# Patient Record
Sex: MALE | Race: WHITE | NOT HISPANIC OR LATINO | Employment: UNEMPLOYED | URBAN - METROPOLITAN AREA
[De-identification: names, ages, dates, MRNs, and addresses within clinical notes are randomized per-mention and may not be internally consistent; named-entity substitution may affect disease eponyms.]

---

## 2021-03-03 ENCOUNTER — OFFICE VISIT CONVERTED (OUTPATIENT)
Dept: SURGERY | Facility: CLINIC | Age: 51
End: 2021-03-03
Attending: NURSE PRACTITIONER

## 2021-03-05 ENCOUNTER — HOSPITAL ENCOUNTER (OUTPATIENT)
Dept: PREADMISSION TESTING | Facility: HOSPITAL | Age: 51
Discharge: HOME | End: 2021-03-05
Attending: SURGERY

## 2021-03-05 LAB — SARS-COV-2 RNA SPEC QL NAA+PROBE: NOT DETECTED

## 2021-03-11 ENCOUNTER — HOSPITAL ENCOUNTER (OUTPATIENT)
Dept: GASTROENTEROLOGY | Facility: HOSPITAL | Age: 51
Setting detail: HOSPITAL OUTPATIENT SURGERY
Discharge: HOME | End: 2021-03-11
Attending: SURGERY

## 2021-05-10 NOTE — H&P
History and Physical      Patient Name: Kaushik Walsh   Patient ID: [id number]   Sex: Male   YOB: 1970        Visit Date: March 3, 2021    Provider: GEN Morrow   [facility] [facility]   Location Address: [address] [place] [address]  [id number]   Location Phone: [phone]          Chief Complaint  · Age 50 or over  · Constipation  · Epigastric Pain  · GERD  · Requesting EGD and Colonoscopy     abnormal stool study       History Of Present Illness  The patient is a 51 year old male presenting to the Surgical Specialist office on a referral from Constantine Avalos MD.   Kaushik Walsh needs to have a diagnostic colonoscopy and EGD.   Patient states that they have not had a colonoscopy.   Patient currently complains of: constipation, GERD, abnormal stool study, and epigastric pain   Patient Does not have family history of colon cancer.      Patient presents today on referral from Dr. Constantine Avalos for GERD, epigastric pain, and abnormal stool study.  Patient reports that he has GERD and epigastric pain for the last 4 to 5 years.  He denies any dysphagia or feeling as if food gets stuck.  Admits to taking omeprazole for 4 to 5 years.  Despite taking omeprazole he is still with breakthrough symptoms at night.  Patient admits to a positive Cologuard.  Admits to having constipation for several years.  He treats his self with MiraLAX and laxatives almost daily.  Denies any rectal bleeding.  Denies any family history of colorectal cancer.  No previous colonoscopy.       Past Medical History  Disease Name Date Onset Notes   GERD --  --    Mood disorder --  --          Past Surgical History  Procedure Name Date Notes   Deviated Septum Surgery --  --          Medication List  Name Date Started Instructions   amitriptyline 75 mg oral tablet  take 1 tablet (75 mg) by oral route once daily at bedtime   hydroxyzine HCl 25 mg oral tablet  1 tablet PO QD   lamotrigine 250 mg oral tablet  extended release 24hr  take 1 tablet (250 mg) by oral route once daily swallowing whole. Do not crush, chew and/or divide.   Suprep Bowel Prep Kit 17.5-3.13-1.6 gram oral recon soln 03/03/2021 take as directed         Allergy List  Allergen Name Date Reaction Notes   NO KNOWN DRUG ALLERGIES --  --  --        Allergies Reconciled  Family Medical History  Disease Name Relative/Age Notes   Family history of breast cancer  Aunt/50s         Social History  Finding Status Start/Stop Quantity Notes   Alcohol Never --/-- --  --    Caffeine Current every day --/-- --  --    Tobacco Never --/-- --  --          Review of Systems  · Constitutional  o Denies  o : fever, chills  · Eyes  o Denies  o : yellowish discoloration of eyes  · HENT  o Denies  o : difficulty swallowing  · Cardiovascular  o Denies  o : chest pain, chest pain on exertion  · Respiratory  o Denies  o : shortness of breath  · Gastrointestinal  o Admits  o : constipation, heartburn, reflux, abdominal pain  o Denies  o : nausea, vomiting, diarrhea  · Genitourinary  o Denies  o : abnormal color of urine  · Integument  o Denies  o : rash  · Neurologic  o Denies  o : tingling or numbness  · Musculoskeletal  o Denies  o : joint pain  · Endocrine  o Denies  o : weight gain, weight loss      Vitals  Date Time BP Position Site L\R Cuff Size HR RR TEMP (F) WT  HT  BMI kg/m2 BSA m2 O2 Sat FR L/min FiO2 HC       03/03/2021 11:35 AM       16  184lbs 16oz 6'   25.09 2.06             Physical Examination  · Constitutional  o Appearance  o : well developed, well-nourished, patient in no apparent distress  · Head and Face  o Head  o :   § Inspection  § : atraumatic, normocephalic  o Face  o :   § Inspection  § : no facial lesions  · Eyes  o Conjunctivae  o : conjunctivae normal  o Sclerae  o : sclerae white  · Neck  o Inspection/Palpation  o : normal appearance, no masses or tenderness, trachea midline  · Respiratory  o Respiratory Effort  o : breathing unlabored  · Skin and  Subcutaneous Tissue  o General Inspection  o : no lesions present, no areas of discoloration, skin turgor normal, texture normal  · Neurologic  o Mental Status Examination  o :   § Orientation  § : grossly oriented to person, place and time  § Attention  § : attention normal, concentration abilities normal  § Fund of Knowledge  § : fund of knowledge within normal limits, patient aware of current events  o Gait and Station  o : normal gait, able to stand without difficulty  · Psychiatric  o Judgement and Insight  o : judgment and insight intact  o Mood and Affect  o : mood normal, affect appropriate              Assessment  · Abnormal stool test     792.1/R19.5  · Abdominal Pain, Epigastric     789.06/R10.13  · Constipation     564.00/K59.00  · GERD (gastroesophageal reflux disease)     530.81/K21.9  · Pre-op testing     V72.84/Z01.818    Problems Reconciled  Plan  · Orders  o Consent for Colonoscopy with Possible Biopsy - Possible risks/complications, benefits, and alternatives to surgical or invasive procedure have been explained to patient and/or legal guardian. -Patient has been evaluated and can tolerate anesthesia and/or sedation. Risks, benefits, and alternatives to anesthesia and sedation have been explained to patient and/or legal guardian. (56063) - 792.1/R19.5, 564.00/K59.00 - 03/11/2021  o Consent for Esophagogastroduodenoscopy (EGD) with dilation - Possible risks/complications, benefits, and alternatives to surgical or invasive procedure have been explained to patient and/or legal guardian. - Patient has been evaluated and can tolerate anesthesia and/or sedation. Risks, benefits, and alternatives to anesthesia and sedation have been explained to patient and/or legal guardian. (12710) - 789.06/R10.13, 530.81/K21.9 - 03/11/2021  o [facility] [place]37943[place] - V72.84/Z01.818 - 03/05/2021  · Medications  o Medications have been Reconciled  o Transition of Care or Provider  Policy  · Instructions  o Surgical Facility: [facility]  o Handouts Provided Pre-Procedure Instructions including date, time, and location of procedure.   o PLAN: Proceeed with colonoscopy. Patient understands risks/benefits and is willing to proceed.   o PLAN: Proceeed with EGD. Patient understands risks/benefits and is willing to proceed.   o ***Surgical Orders***  o RISK AND BENEFITS:  o Given these options, the patient has verbally expressed an understanding of the risks of the surgery and finds these risks acceptable. Will proceed with surgery as soon as possible.  o O.R. PREP: Per protocol   o IV: Per Anesthesia  o Please sign permit for: Colonoscopy with possible biopsies by Dr. Hernandez.  o Please sign permit for: Esophagogastroduodenoscopy with possible biopsies and dilation by Dr. Hernandez.  o The above History and Physical Examination has been completed within 30 days of admission.  o ***Patient Status***  o Outpatient  o Follow up in the in the office post procedure.  o Electronically Identified Patient Education Materials Provided Electronically  · Disposition  o Call or Return if symptoms worsen or persist.            Electronically Signed by: GEN Morrow -Author on March 3, 2021 12:46:03 PM

## 2021-05-14 VITALS — BODY MASS INDEX: 25.06 KG/M2 | WEIGHT: 185 LBS | HEIGHT: 72 IN | RESPIRATION RATE: 16 BRPM

## 2021-11-24 ENCOUNTER — OFFICE VISIT (OUTPATIENT)
Dept: SURGERY | Facility: CLINIC | Age: 51
End: 2021-11-24

## 2021-11-24 VITALS — WEIGHT: 191 LBS | BODY MASS INDEX: 25.87 KG/M2 | RESPIRATION RATE: 16 BRPM | HEIGHT: 72 IN

## 2021-11-24 DIAGNOSIS — K59.00 CONSTIPATION, UNSPECIFIED CONSTIPATION TYPE: Primary | ICD-10-CM

## 2021-11-24 DIAGNOSIS — K21.9 GASTROESOPHAGEAL REFLUX DISEASE, UNSPECIFIED WHETHER ESOPHAGITIS PRESENT: ICD-10-CM

## 2021-11-24 PROCEDURE — 99213 OFFICE O/P EST LOW 20 MIN: CPT | Performed by: SURGERY

## 2021-11-24 RX ORDER — PROPRANOLOL HYDROCHLORIDE 20 MG/1
TABLET ORAL
COMMUNITY
Start: 2021-10-14 | End: 2021-12-01

## 2021-11-24 RX ORDER — PANTOPRAZOLE SODIUM 40 MG/1
40 TABLET, DELAYED RELEASE ORAL DAILY
Qty: 30 TABLET | Refills: 2 | Status: SHIPPED | OUTPATIENT
Start: 2021-11-24 | End: 2021-12-24

## 2021-11-24 RX ORDER — LAMOTRIGINE 100 MG/1
TABLET ORAL
COMMUNITY
Start: 2021-08-26

## 2021-11-24 RX ORDER — AMITRIPTYLINE HYDROCHLORIDE 100 MG/1
100 TABLET, FILM COATED ORAL
COMMUNITY
Start: 2021-10-14

## 2021-11-24 RX ORDER — PROPRANOLOL HYDROCHLORIDE 40 MG/1
TABLET ORAL
COMMUNITY
Start: 2021-10-19

## 2021-11-24 RX ORDER — HYDROXYZINE HYDROCHLORIDE 25 MG/1
25 TABLET, FILM COATED ORAL
COMMUNITY
Start: 2021-11-01

## 2021-11-24 RX ORDER — LAMOTRIGINE 25 MG/1
TABLET ORAL
COMMUNITY
Start: 2021-08-26

## 2021-11-24 RX ORDER — ESZOPICLONE 3 MG/1
3 TABLET, FILM COATED ORAL
COMMUNITY
Start: 2021-09-02 | End: 2021-12-01

## 2021-11-24 RX ORDER — AMITRIPTYLINE HYDROCHLORIDE 75 MG/1
TABLET, FILM COATED ORAL
COMMUNITY
End: 2021-12-01

## 2021-11-24 NOTE — PROGRESS NOTES
General Surgery/Colorectal Surgery Note    Patient Name:  Kaushik Walsh  YOB: 1970  [id number]    Referring Provider: Provider, No Known      Patient Care Team:  Constantine Avalos MD as PCP - General (Family Medicine)  Bubba Hernandez MD as Consulting Physician (General Surgery)    Chief complaint reflux    Subjective .     History of present illness:    Previously seen by me.  History of reflux, epigastric abdominal pain, dysphagia, constipation.  Status post EGD by me 3/11/2021 with 6 mm polyp removed in the stomach body, no hiatal hernia  Duodenal biopsy with peptic duodenitis, no evidence of celiac, negative for H. pylori, fundic gland polyp, reflux esophagitis at GE junction negative for metaplasia  Colonoscopy 3/11/2021 normal.    He comes in for follow-up.  He still having intermittent reflux.  No current treatment.  No tobacco abuse.  He was told that his reflux is causing some damage to his teeth as well as a hoarse voice.  He is also having significant bloating with a long history of constipation treated with over-the-counter medications.  No right upper quadrant abdominal pain.  1 bowel movement per week.  No previous evaluation by GI.    History:  No past medical history on file.    No past surgical history on file.    No family history on file.    Social History     Tobacco Use   • Smoking status: Not on file   • Smokeless tobacco: Not on file   Substance Use Topics   • Alcohol use: Not on file   • Drug use: Not on file       Review of Systems  All systems were reviewed and negative except for:   Review of Systems   Constitutional: Negative for chills, fever and unexpected weight loss.   HENT: Negative for congestion, nosebleeds and voice change.    Eyes: Negative for blurred vision, double vision and discharge.   Respiratory: Negative for apnea, chest tightness and shortness of breath.    Cardiovascular: Negative for chest pain and leg swelling.   Gastrointestinal:        See HPI  "  Endocrine: Negative for cold intolerance and heat intolerance.   Genitourinary: Negative for dysuria, hematuria and urgency.   Musculoskeletal: Negative for back pain, joint swelling and neck pain.   Skin: Negative for color change and dry skin.   Neurological: Negative for dizziness and confusion.   Hematological: Negative for adenopathy.   Psychiatric/Behavioral: Negative for agitation and behavioral problems.     MEDS:  Prior to Admission medications    Not on File        Allergies:  Patient has no allergy information on record.    Objective     Vital Signs        Physical Exam:     General Appearance:    Alert, cooperative, in no acute distress   Head:    Normocephalic, without obvious abnormality, atraumatic   Eyes:          Conjunctivae and sclerae normal, no icterus,     Ears:    Ears appear intact with no abnormalities noted   Throat:   No oral lesions, no thrush, oral mucosa moist   Neck:   No adenopathy, supple, trachea midline, no thyromegaly   Back:     No kyphosis present, no scoliosis present, no skin lesions,      erythema or scars, no tenderness to percussion or                   palpation,   range of motion normal   Lungs:     Clear to auscultation,respirations regular, even and                  unlabored    Heart:    Regular rhythm and normal rate, normal S1 and S2, no            murmur, no gallop, no rub, no click   Chest Wall:    No abnormalities observed   Abdomen:     Normal bowel sounds, no masses, no organomegaly, soft        non-tender, non-distended, no guarding, no rebound                tenderness   Rectal:        Extremities:   Moves all extremities well, no edema, no cyanosis, no             redness   Pulses:   Pulses palpable and equal bilaterally   Skin:   No bleeding, bruising or rash   Lymph nodes:   No palpable adenopathy   Neurologic:   A/o x 4 with no deficits       Results Review:   {Results Review:33827::\"I reviewed the patient's new clinical results.\"    LABS/IMAGING:  No " results found for this or any previous visit.     Result Review :     Assessment/Plan     GERD with esophagitis  Abdominal bloating and constipation, possible IBS    I had a discussion with the patient.  I reviewed the findings of the upper and lower endoscopy with him.  I reviewed the pathology.  I will start him on a PPI.  If this does not improve his symptoms then we will consider reflux surgery.  I will refer him to gastroenterology for medical treatment of his constipation.  Orders placed.  All questions answered.  He agrees with the plan.  Follow-up with me in 4 to 6 weeks if reflux not improved on medication.  Thank you for this consult.              This document has been electronically signed by Bubba Hernandez MD  November 24, 2021 12:49 EST

## 2021-12-01 ENCOUNTER — OFFICE VISIT (OUTPATIENT)
Dept: GASTROENTEROLOGY | Facility: CLINIC | Age: 51
End: 2021-12-01

## 2021-12-01 VITALS
HEART RATE: 58 BPM | BODY MASS INDEX: 25.84 KG/M2 | HEIGHT: 72 IN | DIASTOLIC BLOOD PRESSURE: 78 MMHG | SYSTOLIC BLOOD PRESSURE: 126 MMHG | WEIGHT: 190.8 LBS

## 2021-12-01 DIAGNOSIS — K59.04 CHRONIC IDIOPATHIC CONSTIPATION: Primary | ICD-10-CM

## 2021-12-01 DIAGNOSIS — R10.84 GENERALIZED ABDOMINAL PAIN: ICD-10-CM

## 2021-12-01 DIAGNOSIS — R14.0 ABDOMINAL BLOATING: ICD-10-CM

## 2021-12-01 PROCEDURE — 99214 OFFICE O/P EST MOD 30 MIN: CPT | Performed by: NURSE PRACTITIONER

## 2021-12-01 NOTE — PROGRESS NOTES
Chief Complaint  Constipation    Kaushik Walsh is a 51 y.o. male who presents to [facility] on referral from Bubba Hernandez MD for a gastroenterology evaluation of constipation.  History of Present Illness  He was evaluated earlier this year by Dr. Hernandez (general surgery) for reflux and positive Cologuard.  EGD and colonoscopy performed on 3/11/2021 revealed a normal GE junction and gastric antrum.  6 mm polyp found in the stomach body.  Normal duodenum.  Normal mucosa in the whole colon.  Duodenum biopsy suggestive of peptic duodenitis.  Stomach body polyp-fundic gland polyp.  GE junction biopsy with features suggestive of mild reflux esophagitis.    He admits abdominal bloating and constipation that's been present for several years.  He has previously tried miralax and fiber which caused worsening bloating.      He is taking 2-3 stool softeners after every meal.  He then takes a stimulant laxative eery 3-4 days and will have a bowel movemetn about once per week and has several bowel movements.  Denies rectal bleeding.  Admits generalized abdominal pain and bloating.  Not improved or worsened with anything.      Admits reflux that is controlled with protonix.          Past Medical History:   Diagnosis Date   • Anxiety        Past Surgical History:   Procedure Laterality Date   • COLONOSCOPY      [facility]   • NASAL SEPTAL RECONSTRUCTION     • UPPER GASTROINTESTINAL ENDOSCOPY      [facility]         Current Outpatient Medications:   •  amitriptyline (ELAVIL) 100 MG tablet, Take 100 mg by mouth every night at bedtime., Disp: , Rfl:   •  hydrOXYzine (ATARAX) 25 MG tablet, Take 25 mg by mouth every night at bedtime., Disp: , Rfl:   •  lamoTRIgine (LaMICtal) 100 MG tablet, , Disp: , Rfl:   •  lamoTRIgine (LaMICtal) 25 MG tablet, , Disp: , Rfl:   •  pantoprazole (Protonix) 40 MG EC tablet, Take 1 tablet by mouth Daily for 30 days., Disp: 30 tablet, Rfl: 2  •  propranolol (INDERAL) 40 MG  "tablet, TAKE 1 TABLET BY MOUTH THREE TIMES DAILY AS NEEDED for anxiety (may use 1/2 TABLET doses), Disp: , Rfl:   •  linaclotide (Linzess) 290 MCG capsule capsule, Take 1 capsule by mouth Every Morning Before Breakfast., Disp: 30 capsule, Rfl: 1     No Known Allergies    Family History   Family history unknown: Yes        Social History     Social History Narrative   • Not on file       Immunization:  Immunization History   Administered Date(s) Administered   • COVID-19 (PFIZER) 03/30/2021, 04/20/2021        Objective     Review of Systems   Constitutional: Negative for fever and unexpected weight loss.   Eyes: Negative for blurred vision and double vision.   Respiratory: Negative for cough and shortness of breath.    Cardiovascular: Negative for chest pain and palpitations.   Gastrointestinal:        See HPI   Genitourinary: Negative for dysuria and hematuria.   Musculoskeletal: Negative for gait problem and joint swelling.   Skin: Negative for rash and skin lesions.   Neurological: Negative for seizures, weakness, numbness and confusion.   Psychiatric/Behavioral: Negative for sleep disturbance and depressed mood.        Vital Signs:   /78 (BP Location: Left arm, Patient Position: Sitting, Cuff Size: Adult)   Pulse 58   Ht 182.9 cm (72\")   Wt 86.5 kg (190 lb 12.8 oz)   BMI 25.88 kg/m²       Physical Exam  Constitutional:       General: He is not in acute distress.     Appearance: Normal appearance. He is well-developed and normal weight.   HENT:      Head: Normocephalic and atraumatic.   Eyes:      Conjunctiva/sclera: Conjunctivae normal.      Pupils: Pupils are equal, round, and reactive to light.      Visual Fields: Right eye visual fields normal and left eye visual fields normal.   Cardiovascular:      Rate and Rhythm: Normal rate and regular rhythm.      Heart sounds: Normal heart sounds.   Pulmonary:      Effort: Pulmonary effort is normal. No retractions.      Breath sounds: Normal breath sounds and " air entry.   Abdominal:      General: Bowel sounds are normal.      Palpations: Abdomen is soft.      Tenderness: There is no abdominal tenderness.      Comments: No appreciable hepatosplenomegaly or ascites   Musculoskeletal:         General: Normal range of motion.      Cervical back: Neck supple.      Right lower leg: No edema.      Left lower leg: No edema.   Lymphadenopathy:      Cervical: No cervical adenopathy.   Skin:     General: Skin is warm and dry.      Findings: No lesion.   Neurological:      General: No focal deficit present.      Mental Status: He is alert and oriented to person, place, and time.   Psychiatric:         Mood and Affect: Mood and affect normal.         Behavior: Behavior normal.         Result Review :   The following data was reviewed by: GEN Gr on 12/01/2021:    2/4/2021 Cologuard-positive                  Assessment and Plan    Diagnoses and all orders for this visit:    1. Chronic idiopathic constipation (Primary)  -     linaclotide (Linzess) 290 MCG capsule capsule; Take 1 capsule by mouth Every Morning Before Breakfast.  Dispense: 30 capsule; Refill: 1    2. Generalized abdominal pain  -     linaclotide (Linzess) 290 MCG capsule capsule; Take 1 capsule by mouth Every Morning Before Breakfast.  Dispense: 30 capsule; Refill: 1    3. Abdominal bloating  -     linaclotide (Linzess) 290 MCG capsule capsule; Take 1 capsule by mouth Every Morning Before Breakfast.  Dispense: 30 capsule; Refill: 1      Recommend patient begin Linzess (new Rx sent) for treatment of constipation.  Discussed with patient that most common side effect is diarrhea.  If this occurs and does not resolve within the first 2 weeks of treatment patient will call office.      Follow Up   Return in about 3 months (around 3/1/2022) for constipation.  Patient was given instructions and counseling regarding his condition or for health maintenance advice. Please see specific information pulled into the  AVS if appropriate.

## 2021-12-06 ENCOUNTER — TELEPHONE (OUTPATIENT)
Dept: GASTROENTEROLOGY | Facility: CLINIC | Age: 51
End: 2021-12-06

## 2021-12-06 NOTE — TELEPHONE ENCOUNTER
Patient was seen last week and was given Linzess, he has taken it for a week now and has still not been able to have a bowel movement. He said he had a small bm last night but not very much at all. He is wanting to know what we recommend?

## 2021-12-07 NOTE — TELEPHONE ENCOUNTER
Please advise him to try taking the Linzess with a fatty meal.  Sometimes this will improve the effect.  If that is not effective, would recommend that he drink 1 bottle of magnesium citrate followed by water and then continue linzess daily.

## 2022-01-03 ENCOUNTER — TELEPHONE (OUTPATIENT)
Dept: GASTROENTEROLOGY | Facility: CLINIC | Age: 52
End: 2022-01-03

## 2022-01-03 NOTE — TELEPHONE ENCOUNTER
Patient called in today and says that he is feeling better with the Linzess, he does however still have to take a laxative every 5-7 days to get things moving but says that is normal for him.  He wants to know if we recommend he still take the linzess or try something new.

## 2022-01-04 NOTE — TELEPHONE ENCOUNTER
I would prefer that he not have to take a stimulant laxative in addition to the linzess.  Is Linzess the first prescription medication that he's used for constipation?

## 2022-01-05 RX ORDER — PLECANATIDE 3 MG/1
3 TABLET ORAL DAILY
Qty: 90 TABLET | Refills: 1 | Status: SHIPPED | OUTPATIENT
Start: 2022-01-05 | End: 2022-01-11 | Stop reason: CLARIF

## 2022-01-11 RX ORDER — LUBIPROSTONE 24 UG/1
24 CAPSULE ORAL 2 TIMES DAILY WITH MEALS
Qty: 60 CAPSULE | Refills: 3 | Status: SHIPPED | OUTPATIENT
Start: 2022-01-11 | End: 2022-04-11

## 2022-01-11 NOTE — TELEPHONE ENCOUNTER
Patient states that Trulance is not covered by his insurance plan but Palomo is, he is wondering if we can send this instead.

## 2022-03-04 RX ORDER — PANTOPRAZOLE SODIUM 40 MG/1
40 TABLET, DELAYED RELEASE ORAL DAILY
Qty: 90 TABLET | Refills: 0 | Status: SHIPPED | OUTPATIENT
Start: 2022-03-04 | End: 2023-03-04

## 2022-03-04 NOTE — TELEPHONE ENCOUNTER
Patient asked for refills on Pantoprazole 40 mg daily #90.  He isn't sure if this is a medication Dr. Hernandez wants him to take long-term.    Pharmacy verified with patient.

## 2022-03-04 NOTE — TELEPHONE ENCOUNTER
I called the patient and he said he is happy with the reflux medication.  He tried stopping the medication and symptoms returned.  Medication is helping.

## 2023-06-12 ENCOUNTER — OFFICE VISIT (OUTPATIENT)
Dept: FAMILY MEDICINE CLINIC | Facility: CLINIC | Age: 53
End: 2023-06-12
Payer: MEDICARE

## 2023-06-12 VITALS
BODY MASS INDEX: 25.06 KG/M2 | SYSTOLIC BLOOD PRESSURE: 128 MMHG | HEART RATE: 61 BPM | DIASTOLIC BLOOD PRESSURE: 86 MMHG | HEIGHT: 72 IN | OXYGEN SATURATION: 97 % | WEIGHT: 185 LBS

## 2023-06-12 DIAGNOSIS — Z12.5 PROSTATE CANCER SCREENING: ICD-10-CM

## 2023-06-12 DIAGNOSIS — Z11.59 NEED FOR HEPATITIS C SCREENING TEST: ICD-10-CM

## 2023-06-12 DIAGNOSIS — Z76.89 ENCOUNTER TO ESTABLISH CARE: ICD-10-CM

## 2023-06-12 DIAGNOSIS — Z00.00 MEDICARE ANNUAL WELLNESS VISIT, INITIAL: Primary | ICD-10-CM

## 2023-06-12 DIAGNOSIS — E78.5 DYSLIPIDEMIA: ICD-10-CM

## 2023-06-12 DIAGNOSIS — Z71.85 VACCINE COUNSELING: ICD-10-CM

## 2023-06-12 PROBLEM — K21.9 GASTROESOPHAGEAL REFLUX DISEASE: Status: ACTIVE | Noted: 2017-03-07

## 2023-06-12 PROBLEM — F32.A DEPRESSIVE DISORDER: Status: ACTIVE | Noted: 2017-03-07

## 2023-06-12 PROBLEM — F41.9 ANXIETY DISORDER: Status: ACTIVE | Noted: 2017-03-07

## 2023-06-12 PROBLEM — F39 MOOD DISORDER: Status: ACTIVE | Noted: 2023-06-12

## 2023-06-12 PROCEDURE — 1160F RVW MEDS BY RX/DR IN RCRD: CPT | Performed by: NURSE PRACTITIONER

## 2023-06-12 PROCEDURE — G0438 PPPS, INITIAL VISIT: HCPCS | Performed by: NURSE PRACTITIONER

## 2023-06-12 PROCEDURE — 1159F MED LIST DOCD IN RCRD: CPT | Performed by: NURSE PRACTITIONER

## 2023-06-12 PROCEDURE — 1170F FXNL STATUS ASSESSED: CPT | Performed by: NURSE PRACTITIONER

## 2023-06-12 RX ORDER — PANTOPRAZOLE SODIUM 40 MG/1
TABLET, DELAYED RELEASE ORAL
COMMUNITY
Start: 2023-04-20

## 2023-06-12 RX ORDER — PERPHENAZINE 2 MG
TABLET ORAL
COMMUNITY

## 2023-06-12 RX ORDER — THEANINE 100 MG
CAPSULE ORAL
COMMUNITY

## 2023-06-12 NOTE — PROGRESS NOTES
The ABCs of the Annual Wellness Visit  Initial Medicare Wellness Visit    Subjective       Kaushik Walsh is a 53 y.o. male who presents for an Initial Medicare Wellness Visit.    The following portions of the patient's history were reviewed and updated as appropriate: allergies, current medications, past family history, past medical history, past social history, past surgical history, and problem list.     Compared to one year ago, the patient feels his physical health is better.    Compared to one year ago, the patient feels his mental health is the same.    Recent Hospitalizations:  He was not admitted to the hospital during the last year.       Current Medical Providers:  Patient Care Team:  Maria Esther Cid APRN as PCP - General (Nurse Practitioner)  Bubba Hernandez MD as Consulting Physician (General Surgery)  Jimbo Matthew APRN - mental health    Outpatient Medications Prior to Visit   Medication Sig Dispense Refill    amitriptyline (ELAVIL) 100 MG tablet Take 1 tablet by mouth every night at bedtime.      hydrOXYzine (ATARAX) 25 MG tablet Take 1 tablet by mouth every night at bedtime. Pt takes 2 to 3 tables QHS  as needed for sleep      L-Theanine 100 MG capsule Take  by mouth.      lamoTRIgine (LaMICtal) 100 MG tablet 2 tablets. Take 1/2 in the AM and a whole in the PM      Omega 3-6-9 capsule Take  by mouth.      pantoprazole (PROTONIX) 40 MG EC tablet       propranolol (INDERAL) 40 MG tablet TAKE 1 TABLET BY MOUTH THREE TIMES DAILY AS NEEDED for anxiety (may use 1/2 TABLET doses)      lamoTRIgine (LaMICtal) 25 MG tablet        No facility-administered medications prior to visit.       No opioid medication identified on active medication list. I have reviewed chart for other potential  high risk medication/s and harmful drug interactions in the elderly.        Aspirin is not on active medication list.  Aspirin use is not indicated based on review of current medical condition/s. Risk of harm  "outweighs potential benefits.      Patient Active Problem List   Diagnosis    Gastroesophageal reflux disease    Anxiety disorder    Depressive disorder    Mood disorder     Advance Care Planning   Advance Care Planning     Advance Directive is not on file.  ACP discussion was held with the patient during this visit. Patient does not have an advance directive, information provided.       Objective    Vitals:    06/12/23 0925   BP: 128/86   Pulse: 61   SpO2: 97%   Weight: 83.9 kg (185 lb)   Height: 181.6 cm (71.5\")     Estimated body mass index is 25.44 kg/m² as calculated from the following:    Height as of this encounter: 181.6 cm (71.5\").    Weight as of this encounter: 83.9 kg (185 lb).    BMI is >= 25 and <30. (Overweight) The following options were offered after discussion;: weight loss educational material (shared in after visit summary)      Does the patient have evidence of cognitive impairment? No    Physical Exam  Vitals reviewed.   Constitutional:       General: He is not in acute distress.     Appearance: Normal appearance. He is well-developed.   HENT:      Head: Normocephalic and atraumatic.      Ears:      Comments: Left EAC with very small, likely less than 2mm white papular appearing lesion - resembling a pimple at the 9 o'clock position.  Eyes:      General: No scleral icterus.     Extraocular Movements: Extraocular movements intact.      Conjunctiva/sclera: Conjunctivae normal.   Cardiovascular:      Rate and Rhythm: Normal rate and regular rhythm.      Pulses: Normal pulses.      Heart sounds: No murmur heard.  Pulmonary:      Effort: Pulmonary effort is normal. No respiratory distress.      Breath sounds: Normal breath sounds. No wheezing, rhonchi or rales.   Musculoskeletal:         General: Normal range of motion.      Cervical back: Normal range of motion.   Skin:     General: Skin is warm and dry.   Neurological:      Mental Status: He is alert and oriented to person, place, and time. "   Psychiatric:         Mood and Affect: Mood and affect normal.         Behavior: Behavior normal.         Thought Content: Thought content normal.         Judgment: Judgment normal.               HEALTH RISK ASSESSMENT    Smoking Status:  Social History     Tobacco Use   Smoking Status Never   Smokeless Tobacco Current    Types: Chew     Alcohol Consumption:  Social History     Substance and Sexual Activity   Alcohol Use Not Currently     Fall Risk Screen:    ALEXSANDER Fall Risk Assessment was completed, and patient is at LOW risk for falls.Assessment completed on:2023    Depression Screen:       2023     9:32 AM   PHQ-2/PHQ-9 Depression Screening   Little Interest or Pleasure in Doing Things 0-->not at all   Feeling Down, Depressed or Hopeless 0-->not at all   PHQ-9: Brief Depression Severity Measure Score 0       Health Habits and Functional and Cognitive Screenin/12/2023     9:00 AM   Functional & Cognitive Status   Do you have difficulty preparing food and eating? No   Do you have difficulty bathing yourself, getting dressed or grooming yourself? No   Do you have difficulty using the toilet? No   Do you have difficulty moving around from place to place? No   Do you have trouble with steps or getting out of a bed or a chair? No   Current Diet Well Balanced Diet   Dental Exam Up to date   Eye Exam Up to date   Exercise (times per week) 7 times per week   Current Exercises Include Walking   Do you need help using the phone?  No   Are you deaf or do you have serious difficulty hearing?  No   Do you need help with transportation? No   Do you need help shopping? No   Do you need help preparing meals?  No   Do you need help with housework?  No   Do you need help with laundry? No   Do you need help taking your medications? No   Do you need help managing money? No   Do you ever drive or ride in a car without wearing a seat belt? No   Have you felt unusual stress, anger or loneliness in the last month? No    Who do you live with? Alone   If you need help, do you have trouble finding someone available to you? No   Have you been bothered in the last four weeks by sexual problems? No   Do you have difficulty concentrating, remembering or making decisions? Yes       Age-appropriate Screening Schedule:  Refer to the list below for future screening recommendations based on patient's age, sex and/or medical conditions. Orders for these recommended tests are listed in the plan section. The patient has been provided with a written plan.    Health Maintenance   Topic Date Due    TDAP/TD VACCINES (1 - Tdap) Never done    ZOSTER VACCINE (1 of 2) Never done    HEPATITIS C SCREENING  Never done    INFLUENZA VACCINE  08/01/2023    ANNUAL WELLNESS VISIT  06/12/2024    COLORECTAL CANCER SCREENING  03/11/2031    COVID-19 Vaccine  Completed    Pneumococcal Vaccine 0-64  Aged Out          CMS Preventative Services Quick Reference  Risk Factors Identified During Encounter    Immunizations Discussed/Encouraged: Tdap and Shingrix  Dental Screening Recommended  Vision Screening Recommended    The above risks/problems have been discussed with the patient.  Pertinent information has been shared with the patient in the After Visit Summary.  An After Visit Summary and PPPS were made available to the patient.  Diagnoses and all orders for this visit:    1. Medicare annual wellness visit, initial (Primary)    2. Encounter to establish care    3. Vaccine counseling  Comments:  Recommended: TDaP, Shingrix    4. Prostate cancer screening  -     PSA Screen; Future    5. Need for hepatitis C screening test  -     Hepatitis C Antibody; Future    6. Dyslipidemia  -     CBC Auto Differential; Future  -     Comprehensive Metabolic Panel; Future  -     Lipid Panel; Future      Follow Up:  Next Medicare Wellness visit to be scheduled in 1 year.      Return in about 3 months (around 9/12/2023) for Next scheduled follow up.

## 2023-06-29 ENCOUNTER — TELEPHONE (OUTPATIENT)
Dept: FAMILY MEDICINE CLINIC | Facility: CLINIC | Age: 53
End: 2023-06-29

## 2023-06-29 DIAGNOSIS — K21.9 GASTROESOPHAGEAL REFLUX DISEASE WITHOUT ESOPHAGITIS: Primary | ICD-10-CM

## 2023-06-29 RX ORDER — PANTOPRAZOLE SODIUM 40 MG/1
40 TABLET, DELAYED RELEASE ORAL DAILY
Qty: 90 TABLET | Refills: 0 | Status: SHIPPED | OUTPATIENT
Start: 2023-06-29

## 2023-06-29 NOTE — TELEPHONE ENCOUNTER
Caller: Kaushik Walsh    Relationship: Self    Best call back number:     [phone]       Requested Prescriptions:   Requested Prescriptions     Pending Prescriptions Disp Refills    pantoprazole (PROTONIX) 40 MG EC tablet      90 DAY SUPPLY    Pharmacy where request should be sent: [facility] - [place] [address] [address] [facility] - [phone]  - [phone] FX     Last office visit with prescribing clinician: 6/12/2023   Last telemedicine visit with prescribing clinician: Visit date not found   Next office visit with prescribing clinician: 9/11/2023     Does the patient have less than a 3 day supply:  [] Yes  [x] No    Would you like a call back once the refill request has been completed: [] Yes [x] No    If the office needs to give you a call back, can they leave a voicemail: [] Yes [x] No    Betty Harvey Rep   06/29/23 13:36 EDT

## 2023-09-06 ENCOUNTER — OFFICE VISIT (OUTPATIENT)
Dept: FAMILY MEDICINE CLINIC | Facility: CLINIC | Age: 53
End: 2023-09-06
Payer: MEDICARE

## 2023-09-06 VITALS
SYSTOLIC BLOOD PRESSURE: 120 MMHG | HEIGHT: 72 IN | OXYGEN SATURATION: 100 % | HEART RATE: 94 BPM | TEMPERATURE: 97.6 F | WEIGHT: 178.4 LBS | BODY MASS INDEX: 24.16 KG/M2 | DIASTOLIC BLOOD PRESSURE: 80 MMHG

## 2023-09-06 DIAGNOSIS — N52.9 ERECTILE DYSFUNCTION, UNSPECIFIED ERECTILE DYSFUNCTION TYPE: Primary | ICD-10-CM

## 2023-09-06 DIAGNOSIS — Z12.5 PROSTATE CANCER SCREENING: ICD-10-CM

## 2023-09-06 DIAGNOSIS — E78.5 DYSLIPIDEMIA: ICD-10-CM

## 2023-09-06 DIAGNOSIS — Z11.59 NEED FOR HEPATITIS C SCREENING TEST: ICD-10-CM

## 2023-09-06 LAB
ALBUMIN SERPL-MCNC: 4.7 G/DL (ref 3.5–5.2)
ALBUMIN/GLOB SERPL: 2 G/DL
ALP SERPL-CCNC: 111 U/L (ref 39–117)
ALT SERPL W P-5'-P-CCNC: 14 U/L (ref 1–41)
ANION GAP SERPL CALCULATED.3IONS-SCNC: 10 MMOL/L (ref 5–15)
AST SERPL-CCNC: 17 U/L (ref 1–40)
BASOPHILS # BLD AUTO: 0.07 10*3/MM3 (ref 0–0.2)
BASOPHILS NFR BLD AUTO: 1.1 % (ref 0–1.5)
BILIRUB SERPL-MCNC: 0.9 MG/DL (ref 0–1.2)
BILIRUB UR QL STRIP: NEGATIVE
BUN SERPL-MCNC: 10 MG/DL (ref 6–20)
BUN/CREAT SERPL: 9.3 (ref 7–25)
CALCIUM SPEC-SCNC: 9.8 MG/DL (ref 8.6–10.5)
CHLORIDE SERPL-SCNC: 102 MMOL/L (ref 98–107)
CHOLEST SERPL-MCNC: 169 MG/DL (ref 0–200)
CLARITY UR: CLEAR
CO2 SERPL-SCNC: 28 MMOL/L (ref 22–29)
COLOR UR: YELLOW
CREAT SERPL-MCNC: 1.07 MG/DL (ref 0.76–1.27)
DEPRECATED RDW RBC AUTO: 45 FL (ref 37–54)
EGFRCR SERPLBLD CKD-EPI 2021: 82.5 ML/MIN/1.73
EOSINOPHIL # BLD AUTO: 0.15 10*3/MM3 (ref 0–0.4)
EOSINOPHIL NFR BLD AUTO: 2.3 % (ref 0.3–6.2)
ERYTHROCYTE [DISTWIDTH] IN BLOOD BY AUTOMATED COUNT: 13.3 % (ref 12.3–15.4)
GLOBULIN UR ELPH-MCNC: 2.3 GM/DL
GLUCOSE SERPL-MCNC: 107 MG/DL (ref 65–99)
GLUCOSE UR STRIP-MCNC: NEGATIVE MG/DL
HCT VFR BLD AUTO: 48.5 % (ref 37.5–51)
HCV AB SER DONR QL: NORMAL
HDLC SERPL-MCNC: 70 MG/DL (ref 40–60)
HGB BLD-MCNC: 16.5 G/DL (ref 13–17.7)
HGB UR QL STRIP.AUTO: NEGATIVE
IMM GRANULOCYTES # BLD AUTO: 0.02 10*3/MM3 (ref 0–0.05)
IMM GRANULOCYTES NFR BLD AUTO: 0.3 % (ref 0–0.5)
KETONES UR QL STRIP: NEGATIVE
LDLC SERPL CALC-MCNC: 84 MG/DL (ref 0–100)
LDLC/HDLC SERPL: 1.18 {RATIO}
LEUKOCYTE ESTERASE UR QL STRIP.AUTO: NEGATIVE
LYMPHOCYTES # BLD AUTO: 1.87 10*3/MM3 (ref 0.7–3.1)
LYMPHOCYTES NFR BLD AUTO: 28.5 % (ref 19.6–45.3)
MCH RBC QN AUTO: 31.4 PG (ref 26.6–33)
MCHC RBC AUTO-ENTMCNC: 34 G/DL (ref 31.5–35.7)
MCV RBC AUTO: 92.2 FL (ref 79–97)
MONOCYTES # BLD AUTO: 0.5 10*3/MM3 (ref 0.1–0.9)
MONOCYTES NFR BLD AUTO: 7.6 % (ref 5–12)
NEUTROPHILS NFR BLD AUTO: 3.95 10*3/MM3 (ref 1.7–7)
NEUTROPHILS NFR BLD AUTO: 60.2 % (ref 42.7–76)
NITRITE UR QL STRIP: NEGATIVE
NRBC BLD AUTO-RTO: 0 /100 WBC (ref 0–0.2)
PH UR STRIP.AUTO: 6 [PH] (ref 5–8)
PLATELET # BLD AUTO: 214 10*3/MM3 (ref 140–450)
PMV BLD AUTO: 11.2 FL (ref 6–12)
POTASSIUM SERPL-SCNC: 4.2 MMOL/L (ref 3.5–5.2)
PROT SERPL-MCNC: 7 G/DL (ref 6–8.5)
PROT UR QL STRIP: NEGATIVE
PSA SERPL-MCNC: 1.41 NG/ML (ref 0–4)
RBC # BLD AUTO: 5.26 10*6/MM3 (ref 4.14–5.8)
SODIUM SERPL-SCNC: 140 MMOL/L (ref 136–145)
SP GR UR STRIP: 1.01 (ref 1–1.03)
TRIGL SERPL-MCNC: 83 MG/DL (ref 0–150)
UROBILINOGEN UR QL STRIP: NORMAL
VLDLC SERPL-MCNC: 15 MG/DL (ref 5–40)
WBC NRBC COR # BLD: 6.56 10*3/MM3 (ref 3.4–10.8)

## 2023-09-06 PROCEDURE — 80053 COMPREHEN METABOLIC PANEL: CPT | Performed by: NURSE PRACTITIONER

## 2023-09-06 PROCEDURE — 80061 LIPID PANEL: CPT | Performed by: NURSE PRACTITIONER

## 2023-09-06 PROCEDURE — 86803 HEPATITIS C AB TEST: CPT | Performed by: NURSE PRACTITIONER

## 2023-09-06 PROCEDURE — 99213 OFFICE O/P EST LOW 20 MIN: CPT | Performed by: NURSE PRACTITIONER

## 2023-09-06 PROCEDURE — 81003 URINALYSIS AUTO W/O SCOPE: CPT | Performed by: NURSE PRACTITIONER

## 2023-09-06 PROCEDURE — G0103 PSA SCREENING: HCPCS | Performed by: NURSE PRACTITIONER

## 2023-09-06 PROCEDURE — 85025 COMPLETE CBC W/AUTO DIFF WBC: CPT | Performed by: NURSE PRACTITIONER

## 2023-09-06 NOTE — PROGRESS NOTES
..  Venipuncture Blood Specimen Collection  Venipuncture performed in LT arm by Vania Hwang MA with good hemostasis. Patient tolerated the procedure well without complications.   09/06/23   Vania Hwang MA

## 2023-09-06 NOTE — PROGRESS NOTES
Chief Complaint  Anxiety and Depression    Subjective            Kaushik Walsh presents to [facility]    Erectile Dysfunction  This is a new problem. The current episode started more than 1 month ago. The problem has been gradually worsening since onset. The nature of his difficulty is maintaining erection. Non-physiologic factors contributing to erectile dysfunction are performance anxiety. He reports no decreased libido. He reports his erection duration to be 1 to 5 minutes. Irritative symptoms do not include frequency, nocturia or urgency. Obstructive symptoms do not include dribbling, incomplete emptying, an intermittent stream, a slower stream, straining or a weak stream. Pertinent negatives include no dysuria, genital pain, hematuria, hesitancy or inability to urinate. The symptoms are aggravated by medications and poor sleep (? related to being tired, but that isn't really a consistent problem and the ED it - also been taking meds for >1 year and not been an issue until recently). Past treatments include nothing. There are no known risk factors (Dips but does not smoke).     Recently in a new relationship and noticed issue with ED -     Past Medical History:   Diagnosis Date    Anxiety        No Known Allergies     Past Surgical History:   Procedure Laterality Date    COLONOSCOPY      [facility]    NASAL SEPTAL RECONSTRUCTION      UPPER GASTROINTESTINAL ENDOSCOPY      [facility]        Social History     Tobacco Use    Smoking status: Never    Smokeless tobacco: Current     Types: Chew   Vaping Use    Vaping Use: Never used   Substance Use Topics    Alcohol use: Not Currently       Family History   Problem Relation Age of Onset    Cancer Neg Hx     Diabetes Neg Hx     Heart disease Neg Hx         Health Maintenance Due   Topic Date Due    TDAP/TD VACCINES (1 - Tdap) Never done    ZOSTER VACCINE (1 of 2) Never done    HEPATITIS C SCREENING  Never done        Current Outpatient Medications  "on File Prior to Visit   Medication Sig    amitriptyline (ELAVIL) 100 MG tablet Take 1 tablet by mouth every night at bedtime.    hydrOXYzine (ATARAX) 25 MG tablet Take 1 tablet by mouth every night at bedtime. Pt takes 2 to 3 tables QHS  as needed for sleep    L-Theanine 100 MG capsule Take  by mouth.    lamoTRIgine (LaMICtal) 100 MG tablet 2 tablets. Take 1/2 in the AM and a whole in the PM    Omega 3-6-9 capsule Take  by mouth.    pantoprazole (PROTONIX) 40 MG EC tablet Take 1 tablet by mouth Daily.    propranolol (INDERAL) 40 MG tablet TAKE 1 TABLET BY MOUTH THREE TIMES DAILY AS NEEDED for anxiety (may use 1/2 TABLET doses)     No current facility-administered medications on file prior to visit.       Immunization History   Administered Date(s) Administered    COVID-19 (MODERNA) BIVALENT 12+YRS 09/30/2022    COVID-19 (MODERNA) Monovalent Original Booster 06/02/2022    COVID-19 (PFIZER) Purple Cap Monovalent 03/30/2021, 04/20/2021, 12/08/2021    Fluzone >6mos 09/23/2020, 12/08/2021, 12/06/2022       Review of Systems   Genitourinary:  Positive for erectile dysfunction. Negative for decreased libido, dysuria, frequency, hematuria, hesitancy, incomplete emptying, nocturia and urgency.      Objective     /80 (BP Location: Right arm, Patient Position: Sitting, Cuff Size: Adult)   Pulse 94   Temp 97.6 °F (36.4 °C)   Ht 181.6 cm (71.5\")   Wt 80.9 kg (178 lb 6.4 oz)   SpO2 100%   BMI 24.53 kg/m²       Physical Exam  Vitals reviewed.   Constitutional:       General: He is not in acute distress.     Appearance: Normal appearance. He is well-developed and normal weight.   HENT:      Head: Normocephalic and atraumatic.   Eyes:      General: No scleral icterus.     Extraocular Movements: Extraocular movements intact.      Conjunctiva/sclera: Conjunctivae normal.   Cardiovascular:      Rate and Rhythm: Normal rate and regular rhythm.      Pulses: Normal pulses.      Heart sounds: No murmur heard.  Pulmonary:      " Effort: Pulmonary effort is normal. No respiratory distress.      Breath sounds: Normal breath sounds. No wheezing, rhonchi or rales.   Musculoskeletal:         General: Normal range of motion.      Cervical back: Normal range of motion.      Right lower leg: No edema.      Left lower leg: No edema.   Skin:     General: Skin is warm and dry.   Neurological:      Mental Status: He is alert and oriented to person, place, and time.   Psychiatric:         Mood and Affect: Mood and affect normal.         Behavior: Behavior normal.         Thought Content: Thought content normal.         Judgment: Judgment normal.       Result Review :                           Assessment and Plan      Diagnoses and all orders for this visit:    1. Erectile dysfunction, unspecified erectile dysfunction type (Primary)  -     Urinalysis With Microscopic If Indicated (No Culture) - Urine, Clean Catch            Follow Up     Return for follow up pending outcome of labs.    He has an outstanding order for CBC, CMP, thyroid profile, lipid profile, and PSA.  I will add a urinalysis to that today.  So long as his labs are unremarkable then I will give him a trial of either Viagra or Cialis, which ever is preferred by his insurance.  We discussed his medications and both propranolol and Elavil have potential side effects for ED/low libido; however, he has been on those medications for at least a year and has not experienced a problem until recently, at which time he started a new relationship.  He also endorses some degree of performance anxiety.  If medication is not efficacious and labs are normal we can refer to urology to further assess if needed.    Patient was given instructions and counseling regarding his condition or for health maintenance advice. Please see specific information pulled into the AVS if appropriate.     BMI is within normal parameters. No other follow-up for BMI required.

## 2023-09-07 ENCOUNTER — TELEPHONE (OUTPATIENT)
Dept: FAMILY MEDICINE CLINIC | Facility: CLINIC | Age: 53
End: 2023-09-07

## 2023-09-07 DIAGNOSIS — N52.9 ERECTILE DYSFUNCTION, UNSPECIFIED ERECTILE DYSFUNCTION TYPE: Primary | ICD-10-CM

## 2023-09-07 RX ORDER — SILDENAFIL 50 MG/1
TABLET, FILM COATED ORAL
Qty: 30 TABLET | Refills: 0 | Status: SHIPPED | OUTPATIENT
Start: 2023-09-07 | End: 2023-09-22

## 2023-09-07 RX ORDER — SILDENAFIL 50 MG/1
TABLET, FILM COATED ORAL
Qty: 30 TABLET | Refills: 0 | Status: SHIPPED | OUTPATIENT
Start: 2023-09-07 | End: 2023-09-07 | Stop reason: SDUPTHER

## 2023-09-07 RX ORDER — TADALAFIL 2.5 MG/1
2.5 TABLET ORAL DAILY
Qty: 30 TABLET | Refills: 0 | Status: SHIPPED | OUTPATIENT
Start: 2023-09-07 | End: 2023-09-22 | Stop reason: SDUPTHER

## 2023-09-07 NOTE — TELEPHONE ENCOUNTER
Pt called and wanted ED meds sent to [facility] in [place] due to cost. They originally were sent to [facility].     Pt also wanted to ask if he could take the daily dose of Cialis instead of Viagra. Pt asked if he could get a [facility] to [facility] but if not possible he would prefer the Cialis.

## 2023-09-11 ENCOUNTER — OFFICE VISIT (OUTPATIENT)
Dept: FAMILY MEDICINE CLINIC | Facility: CLINIC | Age: 53
End: 2023-09-11
Payer: MEDICARE

## 2023-09-11 VITALS
TEMPERATURE: 96.8 F | OXYGEN SATURATION: 99 % | HEART RATE: 84 BPM | BODY MASS INDEX: 24.48 KG/M2 | WEIGHT: 178 LBS | DIASTOLIC BLOOD PRESSURE: 82 MMHG | SYSTOLIC BLOOD PRESSURE: 124 MMHG

## 2023-09-11 DIAGNOSIS — Z71.85 VACCINE COUNSELING: ICD-10-CM

## 2023-09-11 DIAGNOSIS — N52.9 ERECTILE DYSFUNCTION, UNSPECIFIED ERECTILE DYSFUNCTION TYPE: ICD-10-CM

## 2023-09-11 DIAGNOSIS — E78.5 DYSLIPIDEMIA: Primary | ICD-10-CM

## 2023-09-11 PROCEDURE — 99213 OFFICE O/P EST LOW 20 MIN: CPT | Performed by: NURSE PRACTITIONER

## 2023-09-11 NOTE — PROGRESS NOTES
Chief Complaint  Lab Result  (Discuss labs and possibly immunizations)    Subjective    Kaushik Walsh presents to [facility]  History of Present Illness    Kaushik presents to the office today to discuss his most recent labs, and to inquire about immunizations.    He had labs last week.  Results below.  No significant abnormal findings.  I do not have labs for comparison, but he states that his total cholesterol previously was over 200.  He has been taking a fish oil supplement over-the-counter, and he did make some dietary changes.  This lipid profile is excellent.  Completely normal with an HDL of 78.    Since seeing me last he has started taking tadalafil for ED.  He is taking it daily.  He states that it seems to be working well.  He also picked up the prescription for Viagra, but states that he is not taking them simultaneously.  He went ahead and picked up the Viagra in case he was not satisfied with the tadalafil.        Objective   Vital Signs:   Vitals:    09/11/23 0759   BP: 124/82   Pulse: 84   Temp: 96.8 °F (36 °C)   SpO2: 99%   Weight: 80.7 kg (178 lb)       Wt Readings from Last 3 Encounters:   09/11/23 80.7 kg (178 lb)   09/06/23 80.9 kg (178 lb 6.4 oz)   06/12/23 83.9 kg (185 lb)     BP Readings from Last 3 Encounters:   09/11/23 124/82   09/06/23 120/80   06/12/23 128/86       Physical Exam  Vitals reviewed.   Constitutional:       General: He is not in acute distress.     Appearance: Normal appearance. He is well-developed and normal weight.   HENT:      Head: Normocephalic and atraumatic.   Eyes:      General: No scleral icterus.     Extraocular Movements: Extraocular movements intact.      Conjunctiva/sclera: Conjunctivae normal.   Pulmonary:      Effort: Pulmonary effort is normal.   Musculoskeletal:         General: Normal range of motion.      Cervical back: Normal range of motion.      Right lower leg: No edema.      Left lower leg: No edema.   Skin:      General: Skin is warm and dry.   Neurological:      Mental Status: He is alert and oriented to person, place, and time.   Psychiatric:         Mood and Affect: Mood and affect normal.         Behavior: Behavior normal.         Thought Content: Thought content normal.         Judgment: Judgment normal.        Result Review :  The following data was reviewed by: GEN Ayala on 09/11/2023:    Office Visit on 09/06/2023   Component Date Value    Color, UA 09/06/2023 Yellow     Appearance, UA 09/06/2023 Clear     pH, UA 09/06/2023 6.0     Specific Gravity, UA 09/06/2023 1.009     Glucose, UA 09/06/2023 Negative     Ketones, UA 09/06/2023 Negative     Bilirubin, UA 09/06/2023 Negative     Blood, UA 09/06/2023 Negative     Protein, UA 09/06/2023 Negative     Leuk Esterase, UA 09/06/2023 Negative     Nitrite, UA 09/06/2023 Negative     Urobilinogen, UA 09/06/2023 0.2 E.U./dL     WBC 09/06/2023 6.56     RBC 09/06/2023 5.26     Hemoglobin 09/06/2023 16.5     Hematocrit 09/06/2023 48.5     MCV 09/06/2023 92.2     MCH 09/06/2023 31.4     MCHC 09/06/2023 34.0     RDW 09/06/2023 13.3     RDW-SD 09/06/2023 45.0     MPV 09/06/2023 11.2     Platelets 09/06/2023 214     Neutrophil % 09/06/2023 60.2     Lymphocyte % 09/06/2023 28.5     Monocyte % 09/06/2023 7.6     Eosinophil % 09/06/2023 2.3     Basophil % 09/06/2023 1.1     Immature Grans % 09/06/2023 0.3     Neutrophils, Absolute 09/06/2023 3.95     Lymphocytes, Absolute 09/06/2023 1.87     Monocytes, Absolute 09/06/2023 0.50     Eosinophils, Absolute 09/06/2023 0.15     Basophils, Absolute 09/06/2023 0.07     Immature Grans, Absolute 09/06/2023 0.02     nRBC 09/06/2023 0.0     Glucose 09/06/2023 107 (H)     BUN 09/06/2023 10     Creatinine 09/06/2023 1.07     Sodium 09/06/2023 140     Potassium 09/06/2023 4.2     Chloride 09/06/2023 102     CO2 09/06/2023 28.0     Calcium 09/06/2023 9.8     Total Protein 09/06/2023 7.0     Albumin 09/06/2023 4.7     ALT (SGPT)  09/06/2023 14     AST (SGOT) 09/06/2023 17     Alkaline Phosphatase 09/06/2023 111     Total Bilirubin 09/06/2023 0.9     Globulin 09/06/2023 2.3     A/G Ratio 09/06/2023 2.0     BUN/Creatinine Ratio 09/06/2023 9.3     Anion Gap 09/06/2023 10.0     eGFR 09/06/2023 82.5     Total Cholesterol 09/06/2023 169     Triglycerides 09/06/2023 83     HDL Cholesterol 09/06/2023 70 (H)     LDL Cholesterol  09/06/2023 84     VLDL Cholesterol 09/06/2023 15     LDL/HDL Ratio 09/06/2023 1.18     Hepatitis C Ab 09/06/2023 Non-Reactive     PSA 09/06/2023 1.410        Procedures        Assessment and Plan   Diagnoses and all orders for this visit:    1. Dyslipidemia (Primary)  Comments:  Continue dietary changes and fish oil supplement.    2. Erectile dysfunction, unspecified erectile dysfunction type  Comments:  Currently taking tadalafil - will notify me if he changes to viagra based on efficacy    3. Vaccine counseling  Comments:  Recommended: TDaP, Shingrix          FOLLOW UP  Return in about 21 months (around 6/13/2025) for Medicare Wellness, medication refills and fasting labs.    Patient was given instructions and counseling regarding his condition or for health maintenance advice. Please see specific information pulled into the AVS if appropriate.       Maria Esther Cid, APRN  09/11/23  08:15 EDT    CURRENT & DISCONTINUED MEDICATIONS  Current Outpatient Medications   Medication Instructions    amitriptyline (ELAVIL) 100 mg, Oral, Every Night at Bedtime    hydrOXYzine (ATARAX) 25 mg, Oral, Every Night at Bedtime, Pt takes 2 to 3 tables QHS  as needed for sleep     L-Theanine 100 MG capsule Oral    lamoTRIgine (LAMICTAL) 200 mg, Take 1/2 in the AM and a whole in the PM     Omega 3-6-9 capsule Oral    pantoprazole (PROTONIX) 40 mg, Oral, Daily    propranolol (INDERAL) 40 MG tablet TAKE 1 TABLET BY MOUTH THREE TIMES DAILY AS NEEDED for anxiety (may use 1/2 TABLET doses)    sildenafil (Viagra) 50 MG tablet Take 1 to 2 tablets as  needed 30 minutes to 4 hours prior to activity.  Start with 1 tablet and titrate up to 2 if needed.    tadalafil (CIALIS) 2.5 mg, Oral, Daily       There are no discontinued medications.

## 2023-09-21 ENCOUNTER — PATIENT MESSAGE (OUTPATIENT)
Dept: FAMILY MEDICINE CLINIC | Facility: CLINIC | Age: 53
End: 2023-09-21
Payer: MEDICARE

## 2023-09-21 DIAGNOSIS — N52.9 ERECTILE DYSFUNCTION, UNSPECIFIED ERECTILE DYSFUNCTION TYPE: ICD-10-CM

## 2023-09-22 RX ORDER — TADALAFIL 5 MG/1
5 TABLET ORAL DAILY
Qty: 90 TABLET | Refills: 1 | Status: SHIPPED | OUTPATIENT
Start: 2023-09-22

## 2023-09-22 NOTE — TELEPHONE ENCOUNTER
From: Kaushik Walsh  To: Maria Esther Cid  Sent: 9/21/2023 9:10 PM EDT  Subject: Medication status/refill    Hey there    After trying both meds, the Tadalafil seems to be the best option. I did try 5mg since at times the 2.5mg didn’t seem to have enough of an effect so I’ll leave that up to you as far as dosage. But for the most part 2.5mg was good enough.     You can send refills to [facility] in [place] again if you don’t mind.     Thanks

## 2023-12-08 ENCOUNTER — TELEPHONE (OUTPATIENT)
Dept: FAMILY MEDICINE CLINIC | Facility: CLINIC | Age: 53
End: 2023-12-08
Payer: MEDICARE

## 2023-12-08 DIAGNOSIS — K21.9 GASTROESOPHAGEAL REFLUX DISEASE WITHOUT ESOPHAGITIS: ICD-10-CM

## 2023-12-08 RX ORDER — PANTOPRAZOLE SODIUM 40 MG/1
40 TABLET, DELAYED RELEASE ORAL DAILY
Qty: 90 TABLET | Refills: 1 | Status: SHIPPED | OUTPATIENT
Start: 2023-12-08

## 2024-01-01 NOTE — TELEPHONE ENCOUNTER
Patient says that he has never taken another prescription for constipation other than Linzess.   Preventive Care Visit  [facility]  Adilson Hinds MD, Family Medicine  Aug 23, 2024    Assessment & Plan   4 month old, here for preventive care.    Encounter for routine child health examination w/o abnormal findings  Normal well-child visit.  He was assurance given.  Follow-up in 2 months.    - Maternal Health Risk Assessment (72425) - EPDS    Growth      Normal OFC, length and weight    Immunizations   Appropriate vaccinations were ordered.    Anticipatory Guidance    Reviewed age appropriate anticipatory guidance.     reading to baby  NUTRITION:  HEALTH/ SAFETY:    Referrals/Ongoing Specialty Care  None      No follow-ups on file.    Subjective   [place] is presenting for the following:  Well Child C&TC (4 mos) and Imm/Inj (Vaxelis, PCV20 and Rotavirus)      Here for 4-month well-child    No concerns.      2024     3:55 PM   Additional Questions   Accompanied by MOm   Questions for today's visit No   Surgery, major illness, or injury since last physical No         2024    Information    services provided? Yes   Catrachito Villafuerte   Type of interpretation provided Face-to-face    name Curt Echeverria    Agency [facility]          [place]  Depression Scale (EPDS) Risk Assessment: Completed [place]        2024   Social   Lives with Parent(s)    Sibling(s)   Who takes care of your child? Parent(s)   Recent potential stressors None   History of trauma No   Family Hx mental health challenges No   Lack of transportation has limited access to appts/meds No   Do you have housing? (Housing is defined as stable permanent housing and does not include staying ouside in a car, in a tent, in an abandoned building, in an overnight shelter, or couch-surfing.) Yes   Are you worried about losing your housing? No       Multiple values from one day are sorted in reverse-chronological order         2024     3:53 PM   Health Risks/Safety    What type of car seat does your child use?  Infant car seat   Is your child's car seat forward or rear facing? Rear facing   Where does your child sit in the car?  Back seat         2024     3:53 PM   TB Screening   Was your child born outside of the [place]? No         2024     3:53 PM   TB Screening: Consider immunosuppression as a risk factor for TB   Recent TB infection or positive TB test in family/close contacts No          2024   Diet   Questions about feeding? No   What does your baby eat?  Breast milk    Formula   Formula type Enfamil   How does your baby eat? Breastfeeding / Nursing    Bottle   How often does your baby eat? (From the start of one feed to start of the next feed) 2 - 3 hours   Vitamin or supplement use None   In past 12 months, concerned food might run out No   In past 12 months, food has run out/couldn't afford more No       Multiple values from one day are sorted in reverse-chronological order         2024     3:53 PM   Elimination   Bowel or bladder concerns? No concerns         2024     3:53 PM   Sleep   Where does your baby sleep? Crib   In what position does your baby sleep? Back   How many times does your child wake in the night?  1         2024     3:53 PM   Vision/Hearing   Vision or hearing concerns No concerns         2024     3:53 PM   Development/ Social-Emotional Screen   Developmental concerns No   Does your child receive any special services? No     Development     S     Milestones (by observation/ exam/ report) 75-90% ile   SOCIAL/EMOTIONAL:   Smiles on own to get your attention   Chuckles (not yet a full laugh) when you try to make your child laugh   Looks at you, moves, or makes sounds to get or keep your attention  LANGUAGE/COMMUNICATION:   Makes sounds like 'oooo', 'aahh' (cooing)   Makes sounds back when you talk to your child   Turns head towards the sound of your voice  COGNITIVE (LEARNING, THINKING, PROBLEM-SOLVING):   If  hungry, opens mouth when sees breast or bottle   Looks at their own hands with interest  MOVEMENT/PHYSICAL DEVELOPMENT:   Holds head steady without support when you are holding your child   Holds a toy when you put it in their hand   Uses their arm to swing at toys   Brings hands to mouth   Pushes up onto elbows/forearms when on tummy         Objective     Exam  There were no vitals taken for this visit.  No head circumference on file for this encounter.  No weight on file for this encounter.  No height on file for this encounter.  No height and weight on file for this encounter.    Physical Exam  GENERAL: Active, alert, in no acute distress.  SKIN: Clear. No significant rash, abnormal pigmentation or lesions  HEAD: Normocephalic. Normal fontanels and sutures.  EYES: Conjunctivae and cornea normal. Red reflexes present bilaterally.  EARS: Normal canals. Tympanic membranes are normal; gray and translucent.  NOSE: Normal without discharge.  MOUTH/THROAT: Clear. No oral lesions.  NECK: Supple, no masses.  LYMPH NODES: No adenopathy  LUNGS: Clear. No rales, rhonchi, wheezing or retractions  HEART: Regular rhythm. Normal S1/S2. No murmurs. Normal femoral pulses.  ABDOMEN: Soft, non-tender, not distended, no masses or hepatosplenomegaly. Normal umbilicus and bowel sounds.   GENITALIA: Normal male external genitalia. Moiz stage I,  Testes descended bilaterally, no hernia or hydrocele.    EXTREMITIES: Hips normal with negative Ortolani and Coelho. Symmetric creases and  no deformities  NEUROLOGIC: Normal tone throughout. Normal reflexes for age    Prior to immunization administration, verified patients identity using patient s name and date of birth. Please see Immunization Activity for additional information.     Screening Questionnaire for Pediatric Immunization    Is the child sick today?   No   Does the child have allergies to medications, food, a vaccine component, or latex?   No   Has the child had a serious  reaction to a vaccine in the past?   No   Does the child have a long-term health problem with lung, heart, kidney or metabolic disease (e.g., diabetes), asthma, a blood disorder, no spleen, complement component deficiency, a cochlear implant, or a spinal fluid leak?  Is he/she on long-term aspirin therapy?   No   If the child to be vaccinated is 2 through 4 years of age, has a healthcare provider told you that the child had wheezing or asthma in the  past 12 months?   No   If your child is a baby, have you ever been told he or she has had intussusception?   No   Has the child, sibling or parent had a seizure, has the child had brain or other nervous system problems?   No   Does the child have cancer, leukemia, AIDS, or any immune system         problem?   No   Does the child have a parent, brother, or sister with an immune system problem?   No   In the past 3 months, has the child taken medications that affect the immune system such as prednisone, other steroids, or anticancer drugs; drugs for the treatment of rheumatoid arthritis, Crohn s disease, or psoriasis; or had radiation treatments?   No   In the past year, has the child received a transfusion of blood or blood products, or been given immune (gamma) globulin or an antiviral drug?   No   Is the child/teen pregnant or is there a chance that she could become       pregnant during the next month?   No   Has the child received any vaccinations in the past 4 weeks?   No               Immunization questionnaire answers were all negative.      Patient instructed to remain in clinic for 15 minutes afterwards, and to report any adverse reactions.     Screening performed by Adilson Hinds MD on 2024 at 7:02 AM.  Signed Electronically by: Adilson Hinds MD

## 2024-02-08 DIAGNOSIS — N52.9 ERECTILE DYSFUNCTION, UNSPECIFIED ERECTILE DYSFUNCTION TYPE: ICD-10-CM

## 2024-02-08 RX ORDER — TADALAFIL 5 MG/1
5 TABLET ORAL DAILY
Qty: 90 TABLET | Refills: 1 | Status: SHIPPED | OUTPATIENT
Start: 2024-02-08

## 2024-06-12 DIAGNOSIS — K21.9 GASTROESOPHAGEAL REFLUX DISEASE WITHOUT ESOPHAGITIS: ICD-10-CM

## 2024-06-12 RX ORDER — PANTOPRAZOLE SODIUM 40 MG/1
40 TABLET, DELAYED RELEASE ORAL DAILY
Qty: 90 TABLET | Refills: 0 | Status: SHIPPED | OUTPATIENT
Start: 2024-06-12

## 2024-06-13 DIAGNOSIS — N52.9 ERECTILE DYSFUNCTION, UNSPECIFIED ERECTILE DYSFUNCTION TYPE: ICD-10-CM

## 2024-06-14 RX ORDER — TADALAFIL 5 MG/1
5 TABLET ORAL DAILY
Qty: 90 TABLET | Refills: 0 | Status: SHIPPED | OUTPATIENT
Start: 2024-06-14

## 2024-07-12 ENCOUNTER — OFFICE VISIT (OUTPATIENT)
Dept: FAMILY MEDICINE CLINIC | Facility: CLINIC | Age: 54
End: 2024-07-12
Payer: MEDICARE

## 2024-07-12 VITALS
SYSTOLIC BLOOD PRESSURE: 118 MMHG | BODY MASS INDEX: 28.77 KG/M2 | OXYGEN SATURATION: 98 % | HEART RATE: 54 BPM | WEIGHT: 201 LBS | HEIGHT: 70 IN | DIASTOLIC BLOOD PRESSURE: 74 MMHG

## 2024-07-12 DIAGNOSIS — K21.9 GASTROESOPHAGEAL REFLUX DISEASE WITHOUT ESOPHAGITIS: ICD-10-CM

## 2024-07-12 DIAGNOSIS — N52.9 ERECTILE DYSFUNCTION, UNSPECIFIED ERECTILE DYSFUNCTION TYPE: ICD-10-CM

## 2024-07-12 DIAGNOSIS — R22.2 SUBCUTANEOUS NODULE OF ABDOMINAL WALL: ICD-10-CM

## 2024-07-12 DIAGNOSIS — Z71.85 VACCINE COUNSELING: ICD-10-CM

## 2024-07-12 DIAGNOSIS — Z12.5 PROSTATE CANCER SCREENING: ICD-10-CM

## 2024-07-12 DIAGNOSIS — Z00.00 MEDICARE ANNUAL WELLNESS VISIT, SUBSEQUENT: Primary | ICD-10-CM

## 2024-07-12 DIAGNOSIS — E66.3 OVERWEIGHT WITH BODY MASS INDEX (BMI) OF 28 TO 28.9 IN ADULT: ICD-10-CM

## 2024-07-12 LAB
ALBUMIN SERPL-MCNC: 4.4 G/DL (ref 3.5–5.2)
ALBUMIN/GLOB SERPL: 1.8 G/DL
ALP SERPL-CCNC: 127 U/L (ref 39–117)
ALT SERPL W P-5'-P-CCNC: 14 U/L (ref 1–41)
ANION GAP SERPL CALCULATED.3IONS-SCNC: 10.6 MMOL/L (ref 5–15)
AST SERPL-CCNC: 18 U/L (ref 1–40)
BASOPHILS # BLD AUTO: 0.07 10*3/MM3 (ref 0–0.2)
BASOPHILS NFR BLD AUTO: 1.3 % (ref 0–1.5)
BILIRUB SERPL-MCNC: 0.6 MG/DL (ref 0–1.2)
BUN SERPL-MCNC: 13 MG/DL (ref 6–20)
BUN/CREAT SERPL: 13.5 (ref 7–25)
CALCIUM SPEC-SCNC: 9.3 MG/DL (ref 8.6–10.5)
CHLORIDE SERPL-SCNC: 104 MMOL/L (ref 98–107)
CHOLEST SERPL-MCNC: 206 MG/DL (ref 0–200)
CO2 SERPL-SCNC: 26.4 MMOL/L (ref 22–29)
CREAT SERPL-MCNC: 0.96 MG/DL (ref 0.76–1.27)
DEPRECATED RDW RBC AUTO: 44.2 FL (ref 37–54)
EGFRCR SERPLBLD CKD-EPI 2021: 93.3 ML/MIN/1.73
EOSINOPHIL # BLD AUTO: 0.35 10*3/MM3 (ref 0–0.4)
EOSINOPHIL NFR BLD AUTO: 6.7 % (ref 0.3–6.2)
ERYTHROCYTE [DISTWIDTH] IN BLOOD BY AUTOMATED COUNT: 13.2 % (ref 12.3–15.4)
GLOBULIN UR ELPH-MCNC: 2.5 GM/DL
GLUCOSE SERPL-MCNC: 100 MG/DL (ref 65–99)
HCT VFR BLD AUTO: 48.1 % (ref 37.5–51)
HDLC SERPL-MCNC: 64 MG/DL (ref 40–60)
HGB BLD-MCNC: 16 G/DL (ref 13–17.7)
IMM GRANULOCYTES # BLD AUTO: 0.01 10*3/MM3 (ref 0–0.05)
IMM GRANULOCYTES NFR BLD AUTO: 0.2 % (ref 0–0.5)
LDLC SERPL CALC-MCNC: 122 MG/DL (ref 0–100)
LDLC/HDLC SERPL: 1.87 {RATIO}
LYMPHOCYTES # BLD AUTO: 2.01 10*3/MM3 (ref 0.7–3.1)
LYMPHOCYTES NFR BLD AUTO: 38.7 % (ref 19.6–45.3)
MCH RBC QN AUTO: 30.1 PG (ref 26.6–33)
MCHC RBC AUTO-ENTMCNC: 33.3 G/DL (ref 31.5–35.7)
MCV RBC AUTO: 90.6 FL (ref 79–97)
MONOCYTES # BLD AUTO: 0.47 10*3/MM3 (ref 0.1–0.9)
MONOCYTES NFR BLD AUTO: 9 % (ref 5–12)
NEUTROPHILS NFR BLD AUTO: 2.29 10*3/MM3 (ref 1.7–7)
NEUTROPHILS NFR BLD AUTO: 44.1 % (ref 42.7–76)
NRBC BLD AUTO-RTO: 0 /100 WBC (ref 0–0.2)
PLATELET # BLD AUTO: 203 10*3/MM3 (ref 140–450)
PMV BLD AUTO: 11 FL (ref 6–12)
POTASSIUM SERPL-SCNC: 4.4 MMOL/L (ref 3.5–5.2)
PROT SERPL-MCNC: 6.9 G/DL (ref 6–8.5)
RBC # BLD AUTO: 5.31 10*6/MM3 (ref 4.14–5.8)
SODIUM SERPL-SCNC: 141 MMOL/L (ref 136–145)
T4 FREE SERPL-MCNC: 1.18 NG/DL (ref 0.92–1.68)
TRIGL SERPL-MCNC: 111 MG/DL (ref 0–150)
TSH SERPL DL<=0.05 MIU/L-ACNC: 2.1 UIU/ML (ref 0.27–4.2)
VLDLC SERPL-MCNC: 20 MG/DL (ref 5–40)
WBC NRBC COR # BLD AUTO: 5.2 10*3/MM3 (ref 3.4–10.8)

## 2024-07-12 PROCEDURE — 1170F FXNL STATUS ASSESSED: CPT | Performed by: NURSE PRACTITIONER

## 2024-07-12 PROCEDURE — 99213 OFFICE O/P EST LOW 20 MIN: CPT | Performed by: NURSE PRACTITIONER

## 2024-07-12 PROCEDURE — 80053 COMPREHEN METABOLIC PANEL: CPT | Performed by: NURSE PRACTITIONER

## 2024-07-12 PROCEDURE — 1159F MED LIST DOCD IN RCRD: CPT | Performed by: NURSE PRACTITIONER

## 2024-07-12 PROCEDURE — 84439 ASSAY OF FREE THYROXINE: CPT | Performed by: NURSE PRACTITIONER

## 2024-07-12 PROCEDURE — 84443 ASSAY THYROID STIM HORMONE: CPT | Performed by: NURSE PRACTITIONER

## 2024-07-12 PROCEDURE — 85025 COMPLETE CBC W/AUTO DIFF WBC: CPT | Performed by: NURSE PRACTITIONER

## 2024-07-12 PROCEDURE — 80061 LIPID PANEL: CPT | Performed by: NURSE PRACTITIONER

## 2024-07-12 PROCEDURE — G0439 PPPS, SUBSEQ VISIT: HCPCS | Performed by: NURSE PRACTITIONER

## 2024-07-12 PROCEDURE — 1160F RVW MEDS BY RX/DR IN RCRD: CPT | Performed by: NURSE PRACTITIONER

## 2024-07-12 RX ORDER — TADALAFIL 5 MG/1
5 TABLET ORAL DAILY
Qty: 90 TABLET | Refills: 3 | Status: SHIPPED | OUTPATIENT
Start: 2024-07-12

## 2024-07-12 RX ORDER — PANTOPRAZOLE SODIUM 40 MG/1
40 TABLET, DELAYED RELEASE ORAL DAILY
Qty: 90 TABLET | Refills: 3 | Status: SHIPPED | OUTPATIENT
Start: 2024-07-12

## 2024-07-12 NOTE — PROGRESS NOTES
..  Venipuncture Blood Specimen Collection  Venipuncture performed in LT arm by Vania Hwang MA with good hemostasis. Patient tolerated the procedure well without complications.   07/12/24   Vania Hwang MA

## 2024-07-12 NOTE — PROGRESS NOTES
The ABCs of the Annual Wellness Visit  Subsequent Medicare Wellness Visit    Subjective    Kaushik Walsh is a 54 y.o. male who presents for a Subsequent Medicare Wellness Visit.    The following portions of the patient's history were reviewed and updated as appropriate: allergies, current medications, past family history, past medical history, past social history, past surgical history, and problem list.    Compared to one year ago, the patient feels his physical health is the same.    Compared to one year ago, the patient feels his mental health is the same.    Recent Hospitalizations:  He was not admitted to the hospital during the last year.     Current Medical Providers:  Patient Care Team:  Maria Esther Cid APRN as PCP - General (Nurse Practitioner)  Bubba Hernandez MD as Consulting Physician (General Surgery)    Outpatient Medications Prior to Visit   Medication Sig Dispense Refill    amitriptyline (ELAVIL) 100 MG tablet Take 1 tablet by mouth every night at bedtime.      hydrOXYzine (ATARAX) 25 MG tablet Take 1 tablet by mouth every night at bedtime. Pt takes 2 to 3 tables QHS  as needed for sleep      L-Theanine 100 MG capsule Take  by mouth.      lamoTRIgine (LaMICtal) 100 MG tablet 2 tablets. Take 1/2 in the AM and a whole in the PM      Omega 3-6-9 capsule Take  by mouth.      propranolol (INDERAL) 40 MG tablet TAKE 1 TABLET BY MOUTH THREE TIMES DAILY AS NEEDED for anxiety (may use 1/2 TABLET doses)      pantoprazole (PROTONIX) 40 MG EC tablet TAKE 1 TABLET DAILY 90 tablet 0    tadalafil (Cialis) 5 MG tablet Take 1 tablet by mouth Daily. 90 tablet 0     No facility-administered medications prior to visit.       No opioid medication identified on active medication list. I have reviewed chart for other potential  high risk medication/s and harmful drug interactions in the elderly.        Aspirin is not on active medication list.  Aspirin use is not indicated based on review of current medical  "condition/s. Risk of harm outweighs potential benefits.      Patient Active Problem List   Diagnosis    Gastroesophageal reflux disease    Anxiety disorder    Depressive disorder    Mood disorder     Advance Care Planning   Advance Care Planning     Advance Directive is not on file.  ACP discussion was held with the patient during this visit. Patient does not have an advance directive, information provided.     Objective    Vitals:    24 0835   BP: 118/74   Pulse: 54   SpO2: 98%   Weight: 91.2 kg (201 lb)   Height: 177.8 cm (70\")     Estimated body mass index is 28.84 kg/m² as calculated from the following:    Height as of this encounter: 177.8 cm (70\").    Weight as of this encounter: 91.2 kg (201 lb).    BMI is within normal parameters. No other follow-up for BMI required.      Does the patient have evidence of cognitive impairment? No          HEALTH RISK ASSESSMENT    Smoking Status:  Social History     Tobacco Use   Smoking Status Never    Passive exposure: Never   Smokeless Tobacco Current    Types: Chew     Alcohol Consumption:  Social History     Substance and Sexual Activity   Alcohol Use Not Currently     Fall Risk Screen:    STEADI Fall Risk Assessment was completed, and patient is at LOW risk for falls.Assessment completed on:2024    Depression Screenin/12/2024     8:34 AM   PHQ-2/PHQ-9 Depression Screening   Little Interest or Pleasure in Doing Things 0-->not at all   Feeling Down, Depressed or Hopeless 0-->not at all   PHQ-9: Brief Depression Severity Measure Score 0       Health Habits and Functional and Cognitive Screenin/12/2024     8:00 AM   Functional & Cognitive Status   Do you have difficulty preparing food and eating? No   Do you have difficulty bathing yourself, getting dressed or grooming yourself? No   Do you have difficulty using the toilet? No   Do you have difficulty moving around from place to place? No   Do you have trouble with steps or getting out of a bed " "or a chair? No   Current Diet Other        Current Diet Comment \"could be better\"   Dental Exam Up to date   Eye Exam Up to date   Exercise (times per week) 7 times per week   Current Exercises Include Walking   Do you need help using the phone?  No   Are you deaf or do you have serious difficulty hearing?  No   Do you need help to go to places out of walking distance? No   Do you need help shopping? No   Do you need help preparing meals?  No   Do you need help with housework?  No   Do you need help with laundry? No   Do you need help taking your medications? No   Do you need help managing money? No   Do you ever drive or ride in a car without wearing a seat belt? No   Have you felt unusual stress, anger or loneliness in the last month? No   Who do you live with? Alone   If you need help, do you have trouble finding someone available to you? No   Have you been bothered in the last four weeks by sexual problems? No   Do you have difficulty concentrating, remembering or making decisions? No       Age-appropriate Screening Schedule:  Refer to the list below for future screening recommendations based on patient's age, sex and/or medical conditions. Orders for these recommended tests are listed in the plan section. The patient has been provided with a written plan.    Health Maintenance   Topic Date Due    COVID-19 Vaccine (6 - 2023-24 season) 09/01/2023    ZOSTER VACCINE (2 of 2) 11/06/2023    INFLUENZA VACCINE  08/01/2024    ANNUAL WELLNESS VISIT  07/12/2025    BMI FOLLOWUP  07/12/2025    COLORECTAL CANCER SCREENING  03/11/2031    TDAP/TD VACCINES (2 - Td or Tdap) 09/11/2033    HEPATITIS C SCREENING  Completed    Pneumococcal Vaccine 0-64  Aged Out                  CMS Preventative Services Quick Reference  Risk Factors Identified During Encounter    Depression/Dysphoria:  Continue current medications - effective - seeing MHNP  Immunizations Discussed/Encouraged:  Reports being UTD -will contact his pharmacy for " records  Inactivity/Sedentary: Patient was advised to do at least 150 minutes a week of moderate intensity activity such as brisk walking and do at least 2 days a week of activities that strengthen muscles such as resistance training and do activities to improve balance such as standing on one foot about 3 days a week.  Tobacco Use/Dependance Risk - encouraged cessation of tobacco products - not smoking but using chewing tobacco.   Dental Screening Recommended  Vision Screening Recommended    The above risks/problems have been discussed with the patient.    Pertinent information has been shared with the patient in the After Visit Summary.    An After Visit Summary and PPPS were made available to the patient.    Follow Up:   Next Medicare Wellness visit to be scheduled in 1 year.         Additional E&M Note during same encounter follows:  Patient has multiple medical problems which are significant and separately identifiable that require additional work above and beyond the Medicare Wellness Visit.      Chief Complaint  Medicare Wellness-subsequent and Cyst (Abdominal wall)    Subjective        HPI  Kaushik Walsh is also being seen today for evaluation of nodules on the abdomen - recently noticed.     Since seeing me last he has been able to palpate several subcutaneous nodules across the abdomen.  They are not painful.  Seemingly mobile and not fixed.  No trauma to the abdomen.    Stable chronic conditions include GERD and ED.  He is taking Protonix daily without any complaints of dysphagia, nausea, vomiting, abdominal pain, diarrhea or melena.  He does endorse chronic constipation but is up-to-date with colorectal cancer screening and symptoms are unchanged.  Colonoscopy unremarkable with 10-year recall.  He had an EGD at the same time which was unremarkable with the exception of a 6 mm fundic gland polyp.    He reports ED is well-controlled with Cialis once daily.  He gets this through mail order pharmacy for $15  "for 3-month supply.         Objective   Vital Signs:  /74   Pulse 54   Ht 177.8 cm (70\")   Wt 91.2 kg (201 lb)   SpO2 98%   BMI 28.84 kg/m²     Physical Exam  Vitals reviewed.   Constitutional:       General: He is not in acute distress.     Appearance: He is well-developed and overweight.   HENT:      Head: Normocephalic and atraumatic.      Right Ear: Tympanic membrane, ear canal and external ear normal. There is no impacted cerumen.      Left Ear: Tympanic membrane, ear canal and external ear normal. There is no impacted cerumen.      Nose: Nose normal.      Mouth/Throat:      Mouth: Mucous membranes are moist.      Pharynx: Oropharynx is clear. No oropharyngeal exudate or posterior oropharyngeal erythema.   Eyes:      General: No scleral icterus.        Right eye: No discharge.         Left eye: No discharge.      Extraocular Movements: Extraocular movements intact.      Conjunctiva/sclera: Conjunctivae normal.      Pupils: Pupils are equal, round, and reactive to light.   Neck:      Thyroid: No thyroid mass, thyromegaly or thyroid tenderness.      Trachea: Trachea normal.   Cardiovascular:      Rate and Rhythm: Normal rate and regular rhythm.      Pulses: Normal pulses.      Heart sounds: No murmur heard.  Pulmonary:      Effort: Pulmonary effort is normal.      Breath sounds: Normal breath sounds. No wheezing, rhonchi or rales.   Abdominal:      General: Bowel sounds are normal. There is no distension.      Palpations: Abdomen is soft. There is no mass.      Tenderness: There is no abdominal tenderness. There is no guarding or rebound.   Musculoskeletal:         General: Normal range of motion.      Cervical back: Normal range of motion and neck supple. No tenderness.      Right lower leg: No edema.      Left lower leg: No edema.   Lymphadenopathy:      Cervical: No cervical adenopathy.   Skin:     General: Skin is warm and dry.   Neurological:      Mental Status: He is alert and oriented to person, " place, and time.   Psychiatric:         Mood and Affect: Mood and affect normal.         Behavior: Behavior normal.         Thought Content: Thought content normal.         Judgment: Judgment normal.          The following data was reviewed by: GEN Ayala on 07/12/2024:    No visits with results within 1 Month(s) from this visit.   Latest known visit with results is:   Office Visit on 09/06/2023   Component Date Value    Color, UA 09/06/2023 Yellow     Appearance, UA 09/06/2023 Clear     pH, UA 09/06/2023 6.0     Specific Gravity, UA 09/06/2023 1.009     Glucose, UA 09/06/2023 Negative     Ketones, UA 09/06/2023 Negative     Bilirubin, UA 09/06/2023 Negative     Blood, UA 09/06/2023 Negative     Protein, UA 09/06/2023 Negative     Leuk Esterase, UA 09/06/2023 Negative     Nitrite, UA 09/06/2023 Negative     Urobilinogen, UA 09/06/2023 0.2 E.U./dL     WBC 09/06/2023 6.56     RBC 09/06/2023 5.26     Hemoglobin 09/06/2023 16.5     Hematocrit 09/06/2023 48.5     MCV 09/06/2023 92.2     MCH 09/06/2023 31.4     MCHC 09/06/2023 34.0     RDW 09/06/2023 13.3     RDW-SD 09/06/2023 45.0     MPV 09/06/2023 11.2     Platelets 09/06/2023 214     Neutrophil % 09/06/2023 60.2     Lymphocyte % 09/06/2023 28.5     Monocyte % 09/06/2023 7.6     Eosinophil % 09/06/2023 2.3     Basophil % 09/06/2023 1.1     Immature Grans % 09/06/2023 0.3     Neutrophils, Absolute 09/06/2023 3.95     Lymphocytes, Absolute 09/06/2023 1.87     Monocytes, Absolute 09/06/2023 0.50     Eosinophils, Absolute 09/06/2023 0.15     Basophils, Absolute 09/06/2023 0.07     Immature Grans, Absolute 09/06/2023 0.02     nRBC 09/06/2023 0.0     Glucose 09/06/2023 107 (H)     BUN 09/06/2023 10     Creatinine 09/06/2023 1.07     Sodium 09/06/2023 140     Potassium 09/06/2023 4.2     Chloride 09/06/2023 102     CO2 09/06/2023 28.0     Calcium 09/06/2023 9.8     Total Protein 09/06/2023 7.0     Albumin 09/06/2023 4.7     ALT (SGPT) 09/06/2023 14     AST  (SGOT) 09/06/2023 17     Alkaline Phosphatase 09/06/2023 111     Total Bilirubin 09/06/2023 0.9     Globulin 09/06/2023 2.3     A/G Ratio 09/06/2023 2.0     BUN/Creatinine Ratio 09/06/2023 9.3     Anion Gap 09/06/2023 10.0     eGFR 09/06/2023 82.5     Total Cholesterol 09/06/2023 169     Triglycerides 09/06/2023 83     HDL Cholesterol 09/06/2023 70 (H)     LDL Cholesterol  09/06/2023 84     VLDL Cholesterol 09/06/2023 15     LDL/HDL Ratio 09/06/2023 1.18     Hepatitis C Ab 09/06/2023 Non-Reactive     PSA 09/06/2023 1.410      ENDOSCOPY, INT (03/11/2021)  SCANNED - COLONOSCOPY (03/11/2021)       Assessment and Plan     Diagnoses and all orders for this visit:    1. Medicare annual wellness visit, subsequent (Primary)  -     CBC Auto Differential  -     Comprehensive Metabolic Panel  -     Lipid Panel  -     TSH+Free T4    2. Vaccine counseling  Comments:  Age appropriate: Shingrix, COVID-19    3. Overweight with body mass index (BMI) of 28 to 28.9 in adult    4. Gastroesophageal reflux disease without esophagitis  -     pantoprazole (PROTONIX) 40 MG EC tablet; Take 1 tablet by mouth Daily.  Dispense: 90 tablet; Refill: 3    5. Erectile dysfunction, unspecified erectile dysfunction type  -     tadalafil (Cialis) 5 MG tablet; Take 1 tablet by mouth Daily.  Dispense: 90 tablet; Refill: 3    6. Prostate cancer screening  Comments:  Due in September  Orders:  -     PSA Screen; Future    7. Subcutaneous nodule of abdominal wall  -     US Soft Tissue; Future             Follow Up     Return in about 1 year (around 7/12/2025) for Medicare Wellness.    Overall, Kaushik has been doing well for the past year.  We did discuss his BMI today.  He is going to work on improving his diet and increasing his physical activity.  He has really been slacking over the past year in regards to daily steps.  Incorporate weight training 3 to 4 days/week.  Continue Protonix and Cialis.  PSA not due until September.  He is up-to-date on colorectal  cancer screening.    In regards to his concerns for the subcutaneous nodules of the abdominal wall, I suspect that these are lipomas, but we will get an ultrasound to further assess.  If bothersome, enlarging, or painful we can refer him for surgical consultation if desired.      Patient was given instructions and counseling regarding his condition or for health maintenance advice. Please see specific information pulled into the AVS if appropriate.

## 2024-07-15 DIAGNOSIS — R74.8 ELEVATED ALKALINE PHOSPHATASE LEVEL: Primary | ICD-10-CM

## 2024-07-25 ENCOUNTER — HOSPITAL ENCOUNTER (OUTPATIENT)
Dept: ULTRASOUND IMAGING | Facility: HOSPITAL | Age: 54
Discharge: HOME | End: 2024-07-25
Admitting: NURSE PRACTITIONER
Payer: MEDICARE

## 2024-07-25 DIAGNOSIS — R22.2 SUBCUTANEOUS NODULE OF ABDOMINAL WALL: ICD-10-CM

## 2024-07-25 PROCEDURE — 76999 ECHO EXAMINATION PROCEDURE: CPT

## 2024-08-27 ENCOUNTER — OFFICE VISIT (OUTPATIENT)
Dept: FAMILY MEDICINE CLINIC | Facility: CLINIC | Age: 54
End: 2024-08-27
Payer: MEDICARE

## 2024-08-27 VITALS
BODY MASS INDEX: 28.49 KG/M2 | WEIGHT: 199 LBS | TEMPERATURE: 97.7 F | DIASTOLIC BLOOD PRESSURE: 82 MMHG | HEIGHT: 70 IN | HEART RATE: 98 BPM | OXYGEN SATURATION: 93 % | SYSTOLIC BLOOD PRESSURE: 124 MMHG

## 2024-08-27 DIAGNOSIS — J34.89 SINUS PAIN: Primary | ICD-10-CM

## 2024-08-27 DIAGNOSIS — J30.89 NON-SEASONAL ALLERGIC RHINITIS, UNSPECIFIED TRIGGER: ICD-10-CM

## 2024-08-27 DIAGNOSIS — J01.10 ACUTE NON-RECURRENT FRONTAL SINUSITIS: ICD-10-CM

## 2024-08-27 LAB
EXPIRATION DATE: NORMAL
FLUAV AG UPPER RESP QL IA.RAPID: NOT DETECTED
FLUBV AG UPPER RESP QL IA.RAPID: NOT DETECTED
INTERNAL CONTROL: NORMAL
Lab: NORMAL
SARS-COV-2 AG UPPER RESP QL IA.RAPID: NOT DETECTED

## 2024-08-27 PROCEDURE — 1159F MED LIST DOCD IN RCRD: CPT

## 2024-08-27 PROCEDURE — 1160F RVW MEDS BY RX/DR IN RCRD: CPT

## 2024-08-27 PROCEDURE — 87428 SARSCOV & INF VIR A&B AG IA: CPT

## 2024-08-27 PROCEDURE — 99213 OFFICE O/P EST LOW 20 MIN: CPT

## 2024-08-27 RX ORDER — METHYLPREDNISOLONE 4 MG
TABLET, DOSE PACK ORAL
Qty: 1 EACH | Refills: 0 | Status: SHIPPED | OUTPATIENT
Start: 2024-08-27

## 2024-08-27 RX ORDER — FLUTICASONE PROPIONATE 50 MCG
2 SPRAY, SUSPENSION (ML) NASAL DAILY
Qty: 16 G | Refills: 0 | Status: SHIPPED | OUTPATIENT
Start: 2024-08-27

## 2024-08-27 RX ORDER — LAMOTRIGINE 200 MG/1
200 TABLET ORAL DAILY
COMMUNITY
Start: 2024-06-13

## 2024-08-27 RX ORDER — LEVOCETIRIZINE DIHYDROCHLORIDE 5 MG/1
5 TABLET, FILM COATED ORAL EVERY EVENING
Qty: 90 TABLET | Refills: 1 | Status: SHIPPED | OUTPATIENT
Start: 2024-08-27

## 2024-08-27 NOTE — PROGRESS NOTES
"Chief Complaint  Cough, Ear Drainage (Fluid in ears, started in January), and Sinusitis (Some sinus pain)      History of Present Illness:  Kaushik Walsh is a 54 y.o. male who presents to [facility] with a past medical history of  Past Medical History:   Diagnosis Date    Evens Faulkner reports today with complaints of cough, ear drainage and sinus colitis.  He reports he has had a cough on and off since February as well as what he feels like he has fluid in his ears.  He reports over the last several days he has had some sinus pain and congestion.  He denies any mucus production.  He denies any fever.  He does report being around some older adults that may or may not have been sick.         Objective   Vital Signs:   Vitals:    08/27/24 1355   BP: 124/82   BP Location: Left arm   Pulse: 98   Temp: 97.7 °F (36.5 °C)   SpO2: 93%   Weight: 90.3 kg (199 lb)   Height: 177.8 cm (70\")     Body mass index is 28.55 kg/m².    Wt Readings from Last 3 Encounters:   08/27/24 90.3 kg (199 lb)   07/12/24 91.2 kg (201 lb)   09/11/23 80.7 kg (178 lb)     BP Readings from Last 3 Encounters:   08/27/24 124/82   07/12/24 118/74   09/11/23 124/82       Health Maintenance   Topic Date Due    COVID-19 Vaccine (6 - 2023-24 season) 09/01/2023    INFLUENZA VACCINE  08/01/2024    ANNUAL WELLNESS VISIT  07/12/2025    BMI FOLLOWUP  07/12/2025    COLORECTAL CANCER SCREENING  03/11/2031    TDAP/TD VACCINES (2 - Td or Tdap) 09/11/2033    HEPATITIS C SCREENING  Completed    ZOSTER VACCINE  Completed    Pneumococcal Vaccine 0-64  Aged Out       Review of Systems   Physical Exam  Vitals reviewed.   Constitutional:       Appearance: Normal appearance.   HENT:      Right Ear: Tympanic membrane normal.      Left Ear: Tympanic membrane is erythematous.      Nose: Congestion present.      Right Sinus: Frontal sinus tenderness present.      Left Sinus: Frontal sinus tenderness present.      Mouth/Throat:      Pharynx: " Posterior oropharyngeal erythema present.   Eyes:      Pupils: Pupils are equal, round, and reactive to light.   Cardiovascular:      Rate and Rhythm: Normal rate and regular rhythm.   Pulmonary:      Effort: Pulmonary effort is normal.      Breath sounds: Normal breath sounds.   Lymphadenopathy:      Cervical: Cervical adenopathy present.   Skin:     General: Skin is warm and dry.   Neurological:      General: No focal deficit present.      Mental Status: He is alert and oriented to person, place, and time.   Psychiatric:         Mood and Affect: Mood normal.            Result Review :  The following data was reviewed by: GEN Meyer on 08/27/2024:  Common labs          9/6/2023    11:42 7/12/2024    08:59   Common Labs   Glucose 107  100    BUN 10  13    Creatinine 1.07  0.96    Sodium 140  141    Potassium 4.2  4.4    Chloride 102  104    Calcium 9.8  9.3    Albumin 4.7  4.4    Total Bilirubin 0.9  0.6    Alkaline Phosphatase 111  127    AST (SGOT) 17  18    ALT (SGPT) 14  14    WBC 6.56  5.20    Hemoglobin 16.5  16.0    Hematocrit 48.5  48.1    Platelets 214  203    Total Cholesterol 169  206    Triglycerides 83  111    HDL Cholesterol 70  64    LDL Cholesterol  84  122    PSA 1.410       TSH          7/12/2024    08:59   TSH   TSH 2.100               Procedures        Assessment and Plan   Diagnoses and all orders for this visit:    1. Sinus pain (Primary)  -     POCT SARS-CoV-2 Antigen NSARIN + Flu  -     fluticasone (FLONASE) 50 MCG/ACT nasal spray; 2 sprays into the nostril(s) as directed by provider Daily.  Dispense: 16 g; Refill: 0  -     levocetirizine (XYZAL) 5 MG tablet; Take 1 tablet by mouth Every Evening.  Dispense: 90 tablet; Refill: 1    2. Acute non-recurrent frontal sinusitis  -     amoxicillin-clavulanate (AUGMENTIN) 875-125 MG per tablet; Take 1 tablet by mouth 2 (Two) Times a Day for 10 days.  Dispense: 20 tablet; Refill: 0  -     methylPREDNISolone (MEDROL) 4 MG dose pack; Take as  directed on package instructions.  Dispense: 1 each; Refill: 0    3. Non-seasonal allergic rhinitis, unspecified trigger  -     fluticasone (FLONASE) 50 MCG/ACT nasal spray; 2 sprays into the nostril(s) as directed by provider Daily.  Dispense: 16 g; Refill: 0  -     levocetirizine (XYZAL) 5 MG tablet; Take 1 tablet by mouth Every Evening.  Dispense: 90 tablet; Refill: 1        Rapid COVID-19, influenza A and B were negative.  Since patient is symptomatic and has some redness to his left TM, I will treat empirically for sinusitis with Augmentin.  Will send over Medrol Dosepak and fluticasone to help reduce inflammation of persistent symptoms.  Will also begin on daily Xyzal to aid in drying of any drainage as well as fluid in the ears.  Educated patient to follow-up back in office if symptoms do not improve or persist.         FOLLOW UP  Return if symptoms worsen or fail to improve.    Patient was given instructions and counseling regarding his condition or for health maintenance advice. Please see specific information pulled into the AVS if appropriate.       GEN Meyer  08/27/24  15:11 EDT    CURRENT & DISCONTINUED MEDICATIONS  Current Outpatient Medications   Medication Instructions    amitriptyline (ELAVIL) 100 mg, Oral, Every Night at Bedtime    amoxicillin-clavulanate (AUGMENTIN) 875-125 MG per tablet 1 tablet, Oral, 2 Times Daily    fluticasone (FLONASE) 50 MCG/ACT nasal spray 2 sprays, Nasal, Daily    hydrOXYzine (ATARAX) 25 mg, Oral, Every Night at Bedtime, Pt takes 2 to 3 tables QHS  as needed for sleep     lamoTRIgine (LAMICTAL) 200 mg, Oral, Daily    levocetirizine (XYZAL) 5 mg, Oral, Every Evening    methylPREDNISolone (MEDROL) 4 MG dose pack Take as directed on package instructions.    Omega 3-6-9 capsule Oral    pantoprazole (PROTONIX) 40 mg, Oral, Daily    propranolol (INDERAL) 40 MG tablet TAKE 1 TABLET BY MOUTH THREE TIMES DAILY AS NEEDED for anxiety (may use 1/2 TABLET doses)    tadalafil  (CIALIS) 5 mg, Oral, Daily       Medications Discontinued During This Encounter   Medication Reason    L-Theanine 100 MG capsule *Therapy completed    lamoTRIgine (LaMICtal) 100 MG tablet *Therapy completed

## 2024-10-08 ENCOUNTER — CLINICAL SUPPORT (OUTPATIENT)
Dept: FAMILY MEDICINE CLINIC | Facility: CLINIC | Age: 54
End: 2024-10-08
Payer: MEDICARE

## 2024-10-08 DIAGNOSIS — Z12.5 PROSTATE CANCER SCREENING: ICD-10-CM

## 2024-10-08 DIAGNOSIS — R74.8 ELEVATED ALKALINE PHOSPHATASE LEVEL: ICD-10-CM

## 2024-10-08 DIAGNOSIS — Z23 IMMUNIZATION DUE: Primary | ICD-10-CM

## 2024-10-08 LAB
ALBUMIN SERPL-MCNC: 4.1 G/DL (ref 3.5–5.2)
ALBUMIN/GLOB SERPL: 1.6 G/DL
ALP SERPL-CCNC: 128 U/L (ref 39–117)
ALT SERPL W P-5'-P-CCNC: 14 U/L (ref 1–41)
ANION GAP SERPL CALCULATED.3IONS-SCNC: 9 MMOL/L (ref 5–15)
AST SERPL-CCNC: 18 U/L (ref 1–40)
BILIRUB SERPL-MCNC: 0.5 MG/DL (ref 0–1.2)
BUN SERPL-MCNC: 13 MG/DL (ref 6–20)
BUN/CREAT SERPL: 12.4 (ref 7–25)
CALCIUM SPEC-SCNC: 9 MG/DL (ref 8.6–10.5)
CHLORIDE SERPL-SCNC: 103 MMOL/L (ref 98–107)
CO2 SERPL-SCNC: 28 MMOL/L (ref 22–29)
CREAT SERPL-MCNC: 1.05 MG/DL (ref 0.76–1.27)
EGFRCR SERPLBLD CKD-EPI 2021: 83.8 ML/MIN/1.73
GLOBULIN UR ELPH-MCNC: 2.5 GM/DL
GLUCOSE SERPL-MCNC: 89 MG/DL (ref 65–99)
POTASSIUM SERPL-SCNC: 3.9 MMOL/L (ref 3.5–5.2)
PROT SERPL-MCNC: 6.6 G/DL (ref 6–8.5)
PSA SERPL-MCNC: 1.44 NG/ML (ref 0–4)
SODIUM SERPL-SCNC: 140 MMOL/L (ref 136–145)

## 2024-10-08 PROCEDURE — 36415 COLL VENOUS BLD VENIPUNCTURE: CPT | Performed by: NURSE PRACTITIONER

## 2024-10-08 PROCEDURE — 90656 IIV3 VACC NO PRSV 0.5 ML IM: CPT | Performed by: FAMILY MEDICINE

## 2024-10-08 PROCEDURE — G0103 PSA SCREENING: HCPCS | Performed by: NURSE PRACTITIONER

## 2024-10-08 PROCEDURE — 80053 COMPREHEN METABOLIC PANEL: CPT | Performed by: NURSE PRACTITIONER

## 2024-10-08 PROCEDURE — G0008 ADMIN INFLUENZA VIRUS VAC: HCPCS | Performed by: FAMILY MEDICINE

## 2024-10-08 NOTE — PROGRESS NOTES
Venipuncture Blood Specimen Collection  Venipuncture performed in Left arm by Radha Jean-Baptiste MA with good hemostasis. Patient tolerated the procedure well without complications.   10/08/24   Marylin Uriostegui MA

## 2025-01-02 ENCOUNTER — OFFICE VISIT (OUTPATIENT)
Dept: FAMILY MEDICINE CLINIC | Facility: CLINIC | Age: 55
End: 2025-01-02
Payer: MEDICARE

## 2025-01-02 VITALS
TEMPERATURE: 98.1 F | WEIGHT: 201 LBS | BODY MASS INDEX: 28.84 KG/M2 | HEART RATE: 78 BPM | SYSTOLIC BLOOD PRESSURE: 118 MMHG | DIASTOLIC BLOOD PRESSURE: 80 MMHG | OXYGEN SATURATION: 99 %

## 2025-01-02 DIAGNOSIS — J02.9 SORE THROAT: Primary | ICD-10-CM

## 2025-01-02 DIAGNOSIS — R05.1 ACUTE COUGH: ICD-10-CM

## 2025-01-02 DIAGNOSIS — J06.9 ACUTE URI: ICD-10-CM

## 2025-01-02 LAB
EXPIRATION DATE: NORMAL
EXPIRATION DATE: NORMAL
FLUAV AG UPPER RESP QL IA.RAPID: NOT DETECTED
FLUBV AG UPPER RESP QL IA.RAPID: NOT DETECTED
INTERNAL CONTROL: NORMAL
INTERNAL CONTROL: NORMAL
Lab: NORMAL
Lab: NORMAL
S PYO AG THROAT QL: NEGATIVE
SARS-COV-2 AG UPPER RESP QL IA.RAPID: NOT DETECTED

## 2025-01-02 PROCEDURE — 87428 SARSCOV & INF VIR A&B AG IA: CPT | Performed by: FAMILY MEDICINE

## 2025-01-02 PROCEDURE — 99213 OFFICE O/P EST LOW 20 MIN: CPT | Performed by: FAMILY MEDICINE

## 2025-01-02 RX ORDER — BENZONATATE 100 MG/1
100 CAPSULE ORAL 3 TIMES DAILY PRN
Qty: 30 CAPSULE | Refills: 0 | Status: SHIPPED | OUTPATIENT
Start: 2025-01-02 | End: 2025-01-02 | Stop reason: SDUPTHER

## 2025-01-02 RX ORDER — BENZONATATE 100 MG/1
100 CAPSULE ORAL 3 TIMES DAILY PRN
Qty: 30 CAPSULE | Refills: 0 | Status: SHIPPED | OUTPATIENT
Start: 2025-01-02

## 2025-01-02 NOTE — PROGRESS NOTES
Chief Complaint  Cough (Cough x two days )    Subjective      Kaushik Walsh is a 54 y.o. male who presents to [facility]     Sore throat, productive cough with green mucus, body aches. Going on for 2-3 days. A little nausea yesterday. No fevers or chills. No loss of taste, maybe a little loss of smell. Voice is hoarse.    Objective   Vital Signs:   Vitals:    01/02/25 1346   BP: 118/80   Pulse: 78   Temp: 98.1 °F (36.7 °C)   SpO2: 99%   Weight: 91.2 kg (201 lb)     Body mass index is 28.84 kg/m².    Wt Readings from Last 3 Encounters:   01/02/25 91.2 kg (201 lb)   08/27/24 90.3 kg (199 lb)   07/12/24 91.2 kg (201 lb)     BP Readings from Last 3 Encounters:   01/02/25 118/80   08/27/24 124/82   07/12/24 118/74       Health Maintenance   Topic Date Due    COVID-19 Vaccine (6 - 2024-25 season) 09/01/2024    ANNUAL WELLNESS VISIT  07/12/2025    BMI FOLLOWUP  07/12/2025    COLORECTAL CANCER SCREENING  03/11/2031    TDAP/TD VACCINES (2 - Td or Tdap) 09/11/2033    HEPATITIS C SCREENING  Completed    INFLUENZA VACCINE  Completed    ZOSTER VACCINE  Completed    Pneumococcal Vaccine 0-64  Aged Out       Physical Exam  Vitals and nursing note reviewed.   Constitutional:       General: He is not in acute distress.  HENT:      Right Ear: External ear normal.      Left Ear: External ear normal.      Nose: Congestion and rhinorrhea present.      Mouth/Throat:      Mouth: Mucous membranes are moist.      Pharynx: Posterior oropharyngeal erythema present.   Cardiovascular:      Rate and Rhythm: Normal rate and regular rhythm.      Heart sounds: Normal heart sounds.   Pulmonary:      Effort: Pulmonary effort is normal. No respiratory distress.      Breath sounds: Normal breath sounds. No wheezing.   Neurological:      Mental Status: He is alert.   Psychiatric:         Mood and Affect: Mood normal.         Behavior: Behavior normal.          Result Review :  The following data was reviewed by: Jorge  Fidel English MD on 01/02/2025:       Lab Results   Lab 01/02/25  1420   SARS ANTIGEN Not Detected      Lab Results   Lab 01/02/25  1420   INFLUENZA A ANTIGEN NASRIN Not Detected                Lab Results   Lab 01/02/25  1420   INFLUENZA B ANTIGEN NASRIN Not Detected      Lab Results   Lab 01/02/25  1420   RAPID STREP A SCREEN Negative                                    Procedures          Assessment & Plan  Sore throat    Orders:    POCT rapid strep A    POCT SARS-CoV-2 Antigen NASRIN + Flu    Acute cough    Orders:    benzonatate (Tessalon Perles) 100 MG capsule; Take 1 capsule by mouth 3 (Three) Times a Day As Needed for Cough.    Acute URI  Swabs negative in office. Advised supportive measures such as hydration and rest. Can do OTC supportive medications such as an antihistamine or decongestant if needed. Honey can be soothing to a sore throat. Return if new or worsening symptoms.                      FOLLOW UP  Return if symptoms worsen or fail to improve.  Patient was given instructions and counseling regarding his condition or for health maintenance advice. Please see specific information pulled into the AVS if appropriate.       Jorge English MD  01/02/25  14:28 EST    CURRENT & DISCONTINUED MEDICATIONS  Current Outpatient Medications   Medication Instructions    amitriptyline (ELAVIL) 100 mg, Every Night at Bedtime    benzonatate (TESSALON PERLES) 100 mg, Oral, 3 Times Daily PRN    fluticasone (FLONASE) 50 MCG/ACT nasal spray 2 sprays, Nasal, Daily    hydrOXYzine (ATARAX) 25 mg, Every Night at Bedtime    lamoTRIgine (LAMICTAL) 200 mg, Daily    levocetirizine (XYZAL) 5 mg, Oral, Every Evening    Omega 3-6-9 capsule Take  by mouth.    pantoprazole (PROTONIX) 40 mg, Oral, Daily    propranolol (INDERAL) 40 MG tablet TAKE 1 TABLET BY MOUTH THREE TIMES DAILY AS NEEDED for anxiety (may use 1/2 TABLET doses)    tadalafil (CIALIS) 5 mg, Oral, Daily       Medications Discontinued During This Encounter   Medication  Reason    methylPREDNISolone (MEDROL) 4 MG dose pack *Therapy completed

## 2025-06-19 DIAGNOSIS — K21.9 GASTROESOPHAGEAL REFLUX DISEASE WITHOUT ESOPHAGITIS: ICD-10-CM

## 2025-06-19 RX ORDER — PANTOPRAZOLE SODIUM 40 MG/1
40 TABLET, DELAYED RELEASE ORAL DAILY
Qty: 30 TABLET | Refills: 0 | Status: SHIPPED | OUTPATIENT
Start: 2025-06-19

## 2025-07-14 ENCOUNTER — OFFICE VISIT (OUTPATIENT)
Dept: FAMILY MEDICINE CLINIC | Facility: CLINIC | Age: 55
End: 2025-07-14
Payer: MEDICARE

## 2025-07-14 VITALS
SYSTOLIC BLOOD PRESSURE: 124 MMHG | HEIGHT: 70 IN | OXYGEN SATURATION: 98 % | BODY MASS INDEX: 29.31 KG/M2 | TEMPERATURE: 98 F | DIASTOLIC BLOOD PRESSURE: 84 MMHG | WEIGHT: 204.7 LBS | HEART RATE: 95 BPM

## 2025-07-14 DIAGNOSIS — K21.9 GASTROESOPHAGEAL REFLUX DISEASE WITHOUT ESOPHAGITIS: ICD-10-CM

## 2025-07-14 DIAGNOSIS — Z13.6 SCREENING FOR CARDIOVASCULAR CONDITION: ICD-10-CM

## 2025-07-14 DIAGNOSIS — Z00.00 MEDICARE ANNUAL WELLNESS VISIT, SUBSEQUENT: Primary | ICD-10-CM

## 2025-07-14 DIAGNOSIS — Z13.29 THYROID DISORDER SCREEN: ICD-10-CM

## 2025-07-14 DIAGNOSIS — N52.9 ERECTILE DYSFUNCTION, UNSPECIFIED ERECTILE DYSFUNCTION TYPE: ICD-10-CM

## 2025-07-14 DIAGNOSIS — Z13.1 DIABETES MELLITUS SCREENING: ICD-10-CM

## 2025-07-14 DIAGNOSIS — Z23 IMMUNIZATION DUE: ICD-10-CM

## 2025-07-14 LAB
ALBUMIN SERPL-MCNC: 4.4 G/DL (ref 3.5–5.2)
ALBUMIN/GLOB SERPL: 1.8 G/DL
ALP SERPL-CCNC: 131 U/L (ref 39–117)
ALT SERPL W P-5'-P-CCNC: 12 U/L (ref 1–41)
ANION GAP SERPL CALCULATED.3IONS-SCNC: 8 MMOL/L (ref 5–15)
AST SERPL-CCNC: 20 U/L (ref 1–40)
BASOPHILS # BLD AUTO: 0.07 10*3/MM3 (ref 0–0.2)
BASOPHILS NFR BLD AUTO: 1.4 % (ref 0–1.5)
BILIRUB SERPL-MCNC: 0.3 MG/DL (ref 0–1.2)
BUN SERPL-MCNC: 10 MG/DL (ref 6–20)
BUN/CREAT SERPL: 10.1 (ref 7–25)
CALCIUM SPEC-SCNC: 9.6 MG/DL (ref 8.6–10.5)
CHLORIDE SERPL-SCNC: 104 MMOL/L (ref 98–107)
CHOLEST SERPL-MCNC: 209 MG/DL (ref 0–200)
CO2 SERPL-SCNC: 28 MMOL/L (ref 22–29)
CREAT SERPL-MCNC: 0.99 MG/DL (ref 0.76–1.27)
DEPRECATED RDW RBC AUTO: 44.6 FL (ref 37–54)
EGFRCR SERPLBLD CKD-EPI 2021: 89.4 ML/MIN/1.73
EOSINOPHIL # BLD AUTO: 0.34 10*3/MM3 (ref 0–0.4)
EOSINOPHIL NFR BLD AUTO: 7 % (ref 0.3–6.2)
ERYTHROCYTE [DISTWIDTH] IN BLOOD BY AUTOMATED COUNT: 13.1 % (ref 12.3–15.4)
GLOBULIN UR ELPH-MCNC: 2.5 GM/DL
GLUCOSE SERPL-MCNC: 99 MG/DL (ref 65–99)
HBA1C MFR BLD: 5.8 % (ref 4.8–5.6)
HCT VFR BLD AUTO: 50.9 % (ref 37.5–51)
HDLC SERPL-MCNC: 61 MG/DL (ref 40–60)
HGB BLD-MCNC: 16.5 G/DL (ref 13–17.7)
IMM GRANULOCYTES # BLD AUTO: 0.02 10*3/MM3 (ref 0–0.05)
IMM GRANULOCYTES NFR BLD AUTO: 0.4 % (ref 0–0.5)
LDLC SERPL CALC-MCNC: 137 MG/DL (ref 0–100)
LDLC/HDLC SERPL: 2.23 {RATIO}
LYMPHOCYTES # BLD AUTO: 1.84 10*3/MM3 (ref 0.7–3.1)
LYMPHOCYTES NFR BLD AUTO: 37.8 % (ref 19.6–45.3)
MCH RBC QN AUTO: 29.8 PG (ref 26.6–33)
MCHC RBC AUTO-ENTMCNC: 32.4 G/DL (ref 31.5–35.7)
MCV RBC AUTO: 92 FL (ref 79–97)
MONOCYTES # BLD AUTO: 0.35 10*3/MM3 (ref 0.1–0.9)
MONOCYTES NFR BLD AUTO: 7.2 % (ref 5–12)
NEUTROPHILS NFR BLD AUTO: 2.25 10*3/MM3 (ref 1.7–7)
NEUTROPHILS NFR BLD AUTO: 46.2 % (ref 42.7–76)
NRBC BLD AUTO-RTO: 0 /100 WBC (ref 0–0.2)
PLATELET # BLD AUTO: 208 10*3/MM3 (ref 140–450)
PMV BLD AUTO: 10.5 FL (ref 6–12)
POTASSIUM SERPL-SCNC: 4.7 MMOL/L (ref 3.5–5.2)
PROT SERPL-MCNC: 6.9 G/DL (ref 6–8.5)
RBC # BLD AUTO: 5.53 10*6/MM3 (ref 4.14–5.8)
SODIUM SERPL-SCNC: 140 MMOL/L (ref 136–145)
T4 FREE SERPL-MCNC: 1.03 NG/DL (ref 0.92–1.68)
TRIGL SERPL-MCNC: 60 MG/DL (ref 0–150)
TSH SERPL DL<=0.05 MIU/L-ACNC: 1.55 UIU/ML (ref 0.27–4.2)
VLDLC SERPL-MCNC: 11 MG/DL (ref 5–40)
WBC NRBC COR # BLD AUTO: 4.87 10*3/MM3 (ref 3.4–10.8)

## 2025-07-14 PROCEDURE — 90684 PCV21 VACCINE IM: CPT

## 2025-07-14 PROCEDURE — 1159F MED LIST DOCD IN RCRD: CPT

## 2025-07-14 PROCEDURE — 1170F FXNL STATUS ASSESSED: CPT

## 2025-07-14 PROCEDURE — 84439 ASSAY OF FREE THYROXINE: CPT

## 2025-07-14 PROCEDURE — G0009 ADMIN PNEUMOCOCCAL VACCINE: HCPCS

## 2025-07-14 PROCEDURE — 1160F RVW MEDS BY RX/DR IN RCRD: CPT

## 2025-07-14 PROCEDURE — 85025 COMPLETE CBC W/AUTO DIFF WBC: CPT

## 2025-07-14 PROCEDURE — G0439 PPPS, SUBSEQ VISIT: HCPCS

## 2025-07-14 PROCEDURE — 80053 COMPREHEN METABOLIC PANEL: CPT

## 2025-07-14 PROCEDURE — 84443 ASSAY THYROID STIM HORMONE: CPT

## 2025-07-14 PROCEDURE — 1126F AMNT PAIN NOTED NONE PRSNT: CPT

## 2025-07-14 PROCEDURE — 99213 OFFICE O/P EST LOW 20 MIN: CPT

## 2025-07-14 PROCEDURE — 83036 HEMOGLOBIN GLYCOSYLATED A1C: CPT

## 2025-07-14 PROCEDURE — 80061 LIPID PANEL: CPT

## 2025-07-14 RX ORDER — TADALAFIL 5 MG/1
5 TABLET ORAL DAILY
Qty: 90 TABLET | Refills: 3 | Status: SHIPPED | OUTPATIENT
Start: 2025-07-14

## 2025-07-14 RX ORDER — PANTOPRAZOLE SODIUM 40 MG/1
40 TABLET, DELAYED RELEASE ORAL DAILY
Qty: 90 TABLET | Refills: 3 | Status: SHIPPED | OUTPATIENT
Start: 2025-07-14

## 2025-07-14 NOTE — PROGRESS NOTES
Subjective   The ABCs of the Annual Wellness Visit  Medicare Wellness Visit      Kaushik Walsh is a 55 y.o. patient who presents for a Medicare Wellness Visit.    The following portions of the patient's history were reviewed and   updated as appropriate: allergies, current medications, past family history, past medical history, past social history, past surgical history, and problem list.    Compared to one year ago, the patient's physical   health is the same.  Compared to one year ago, the patient's mental   health is the same.    Recent Hospitalizations:  He was not admitted to the hospital during the last year.     Current Medical Providers:  Patient Care Team:  Maria Esther Cid APRN as PCP - General (Nurse Practitioner)  Bubba Hernandez MD as Consulting Physician (General Surgery)    Outpatient Medications Prior to Visit   Medication Sig Dispense Refill    amitriptyline (ELAVIL) 100 MG tablet Take 1 tablet by mouth every night at bedtime.      hydrOXYzine (ATARAX) 25 MG tablet Take 1 tablet by mouth every night at bedtime. Pt takes 2 to 3 tables QHS  as needed for sleep      lamoTRIgine (LaMICtal) 200 MG tablet Take 1 tablet by mouth Daily.      Omega 3-6-9 capsule Take  by mouth.      propranolol (INDERAL) 40 MG tablet TAKE 1 TABLET BY MOUTH THREE TIMES DAILY AS NEEDED for anxiety (may use 1/2 TABLET doses)      pantoprazole (PROTONIX) 40 MG EC tablet TAKE ONE TABLET BY MOUTH EVERY DAY 30 tablet 0    tadalafil (Cialis) 5 MG tablet Take 1 tablet by mouth Daily. 90 tablet 3    benzonatate (Tessalon Perles) 100 MG capsule Take 1 capsule by mouth 3 (Three) Times a Day As Needed for Cough. (Patient not taking: Reported on 7/14/2025) 30 capsule 0    fluticasone (FLONASE) 50 MCG/ACT nasal spray 2 sprays into the nostril(s) as directed by provider Daily. (Patient not taking: Reported on 7/14/2025) 16 g 0    levocetirizine (XYZAL) 5 MG tablet Take 1 tablet by mouth Every Evening. (Patient not taking:  "Reported on 7/14/2025) 90 tablet 1     No facility-administered medications prior to visit.     No opioid medication identified on active medication list. I have reviewed chart for other potential  high risk medication/s and harmful drug interactions in the elderly.      Aspirin is not on active medication list.  Aspirin use is indicated based on review of current medical condition/s. Pros and cons of this therapy have been discussed with this patient. Benefits of this medication outweigh potential harm.  Patient has been instructed to start taking this medication..    Patient Active Problem List   Diagnosis    Gastroesophageal reflux disease    Anxiety disorder    Depressive disorder    Mood disorder     Advance Care Planning Advance Directive is not on file.  ACP discussion was declined by the patient. Patient does not have an advance directive, information provided.            Objective   Vitals:    07/14/25 1035   BP: 124/84   BP Location: Left arm   Patient Position: Sitting   Cuff Size: Large Adult   Pulse: 95   Temp: 98 °F (36.7 °C)   TempSrc: Temporal   SpO2: 98%   Weight: 92.9 kg (204 lb 11.2 oz)   Height: 177.8 cm (70\")   PainSc: 0-No pain       Estimated body mass index is 29.37 kg/m² as calculated from the following:    Height as of this encounter: 177.8 cm (70\").    Weight as of this encounter: 92.9 kg (204 lb 11.2 oz).    BMI is >= 25 and <30. (Overweight) The following options were offered after discussion;: exercise counseling/recommendations and nutrition counseling/recommendations           Does the patient have evidence of cognitive impairment? No                                                                                                Health  Risk Assessment    Smoking Status:  Social History     Tobacco Use   Smoking Status Never    Passive exposure: Never   Smokeless Tobacco Current    Types: Chew     Alcohol Consumption:  Social History     Substance and Sexual Activity   Alcohol Use Not " Currently       Fall Risk Screen  STEADI Fall Risk Assessment was completed, and patient is at LOW risk for falls.Assessment completed on:2025    Depression Screening   Little interest or pleasure in doing things? Not at all   Feeling down, depressed, or hopeless? Not at all   PHQ-2 Total Score 0      Health Habits and Functional and Cognitive Screenin/14/2025    10:41 AM   Functional & Cognitive Status   Do you have difficulty preparing food and eating? No   Do you have difficulty bathing yourself, getting dressed or grooming yourself? No   Do you have difficulty using the toilet? No   Do you have difficulty moving around from place to place? No   Do you have trouble with steps or getting out of a bed or a chair? No   Current Diet Well Balanced Diet   Dental Exam Up to date   Eye Exam Up to date   Exercise (times per week) 7 times per week   Current Exercises Include Walking   Do you need help using the phone?  No   Are you deaf or do you have serious difficulty hearing?  No   Do you need help to go to places out of walking distance? No   Do you need help shopping? No   Do you need help preparing meals?  No   Do you need help with housework?  No   Do you need help with laundry? No   Do you need help taking your medications? No   Do you need help managing money? No   Do you ever drive or ride in a car without wearing a seat belt? No   Have you felt unusual fatigue (could be tiredness), stress, anger or loneliness in the last month? No   Who do you live with? Spouse   If you need help, do you have trouble finding someone available to you? No   Have you been bothered in the last four weeks by sexual problems? No   Do you have difficulty concentrating, remembering or making decisions? No           Age-appropriate Screening Schedule:  Refer to the list below for future screening recommendations based on patient's age, sex and/or medical conditions. Orders for these recommended tests are listed in the plan  section. The patient has been provided with a written plan.    Health Maintenance List  Health Maintenance   Topic Date Due    COVID-19 Vaccine (6 - 2024-25 season) 07/14/2026 (Originally 9/1/2024)    INFLUENZA VACCINE  10/01/2025    ANNUAL WELLNESS VISIT  07/14/2026    COLORECTAL CANCER SCREENING  03/11/2031    TDAP/TD VACCINES (2 - Td or Tdap) 09/11/2033    HEPATITIS C SCREENING  Completed    Pneumococcal Vaccine 50+  Completed    ZOSTER VACCINE  Completed                                                                                                                                                CMS Preventative Services Quick Reference  Risk Factors Identified During Encounter  Depression/Dysphoria: Current medication is effective, no change recommended  Polypharmacy: Medication List reviewed  Tobacco Use/Dependance Risk (use dotphrase .tobaccocessation for documentation)    The above risks/problems have been discussed with the patient.  Pertinent information has been shared with the patient in the After Visit Summary.  An After Visit Summary and PPPS were made available to the patient.    Follow Up:   Next Medicare Wellness visit to be scheduled in 1 year.         Additional E&M Note during same encounter follows:  Patient has additional, significant, and separately identifiable condition(s)/problem(s) that require work above and beyond the Medicare Wellness Visit     Chief Complaint  Medicare Wellness-subsequent (Pt presents for MAW today. Pt is fasting for labs today.)    Subjective    HPI  Kaushik is also being seen today for an annual adult preventative physical exam.  and Kaushik is also being seen today for additional medical problem/s.       The patient is a 55-year-old male who presents today for a Medicare wellness visit.    He reports no significant changes in his physical or mental health compared to the previous year. He has not required hospitalization within the past year. He does not take aspirin  "daily and does not have a living will or power of . He reports no issues with gait or balance, hearing problems, or chest pain. He has not undergone a CT scan of his chest. He maintains a healthy diet and reports no bone or joint pain, numbness, tingling, or forgetfulness.     He is under the care of behavioral health issues including depression and anxiety. He has discontinued the use of benzonatate, Flonase, and Xyzal. His current medications include Cialis, propranolol, Protonix, omega-3, Lamictal, hydroxyzine, and amitriptyline. He has consulted a gastroenterologist once.    He had ultrasound showing lipomas and is not concerned about them.    Diet: He maintains a healthy diet.  Alcohol: The patient reports no alcohol use.  Tobacco: The patient smokes cigarettes.          Objective   Vital Signs:  /84 (BP Location: Left arm, Patient Position: Sitting, Cuff Size: Large Adult)   Pulse 95   Temp 98 °F (36.7 °C) (Temporal)   Ht 177.8 cm (70\")   Wt 92.9 kg (204 lb 11.2 oz)   SpO2 98%   BMI 29.37 kg/m²   Physical Exam  Vitals reviewed.   Constitutional:       Appearance: Normal appearance.   HENT:      Nose: Nose normal.      Mouth/Throat:      Mouth: Mucous membranes are moist.   Eyes:      Pupils: Pupils are equal, round, and reactive to light.   Cardiovascular:      Rate and Rhythm: Normal rate and regular rhythm.   Pulmonary:      Effort: Pulmonary effort is normal.      Breath sounds: Normal breath sounds.   Musculoskeletal:      Cervical back: Normal range of motion.   Skin:     General: Skin is warm and dry.   Neurological:      General: No focal deficit present.      Mental Status: He is alert and oriented to person, place, and time.   Psychiatric:         Mood and Affect: Mood normal.           Neck: No tenderness  Respiratory: Clear to auscultation, no wheezing, rales or rhonchi    The following data was reviewed by: GEN Meyer on 07/14/2025:  Data reviewed : Radiologic studies  "   Common labs          10/8/2024    09:01   Common Labs   Glucose 89    BUN 13    Creatinine 1.05    Sodium 140    Potassium 3.9    Chloride 103    Calcium 9.0    Albumin 4.1    Total Bilirubin 0.5    Alkaline Phosphatase 128    AST (SGOT) 18    ALT (SGPT) 14    PSA 1.440        Results  Labs   - Cholesterol: Higher end   - Alkaline phosphatase: Slightly elevated    Imaging   - Ultrasound: Lipomas   US Soft Tissue (07/25/2024 10:23)         Assessment and Plan Additional age appropriate preventative wellness advice topics were discussed during today's preventative wellness exam(some topics already addressed during AWV portion of the note above):    Physical Activity: Advised cardiovascular activity 150 minutes per week as tolerated. (example brisk walk for 30 minutes, 5 days a week).     Nutrition: Discussed nutrition plan with patient. Information shared in after visit summary. Goal is for a well balanced diet to enhance overall health.     Healthy Weight: Discussed current and goal BMI with patient. Steps to attain this goal discussed. Information shared in after visit summary.     Gun Safety Awareness Discussion: Information Shared in after visit summary.    (Z00.00) Medicare annual wellness visit, subsequent    (Z23) Immunization due - Plan: Pneumococcal Conjugate Vaccine 21-Valent All    (N52.9) Erectile dysfunction, unspecified erectile dysfunction type - Plan: tadalafil (Cialis) 5 MG tablet    (K21.9) Gastroesophageal reflux disease without esophagitis - Plan: pantoprazole (PROTONIX) 40 MG EC tablet, CBC Auto Differential    (Z13.1) Diabetes mellitus screening - Plan: Comprehensive Metabolic Panel, CBC Auto Differential, Hemoglobin A1c    (Z13.29) Thyroid disorder screen - Plan: TSH+Free T4    (Z13.6) Screening for cardiovascular condition - Plan: Lipid Panel, Comprehensive Metabolic Panel, CBC Auto Differential         1. Medicare wellness visit.  - Blood pressure readings are within the normal range, and  weight remains stable.  - Cholesterol levels were slightly elevated during the last check; alkaline phosphatase levels were marginally above the normal range; PSA levels were within the normal range when last checked 9 months ago.  - Comprehensive blood workup will be conducted, including lipid panel, hemoglobin A1c, and thyroid function tests. PSA levels will be rechecked during the next visit.  - Information regarding living will and power of  will be provided.    2. Lipomas.  - Lipomas are typically benign.  - Informed that unless they cause discomfort or grow in size, no action is necessary.  - Referral to general surgery can be made if removal is desired.    3. Medication management.  - Refills for Cialis and Protonix have been sent to the pharmacy.  - Cialis has been sent to [facility], and Protonix has been sent to [facility].            Follow Up   Return in about 1 year (around 7/14/2026) for Medicare Wellness.  Patient was given instructions and counseling regarding his condition or for health maintenance advice. Please see specific information pulled into the AVS if appropriate.  Patient or patient representative verbalized consent for the use of Ambient Listening during the visit with  GEN Meyer for chart documentation. 7/14/2025  11:24 EDT

## 2025-07-14 NOTE — PROGRESS NOTES
..  Venipuncture Blood Specimen Collection  Venipuncture performed in LT arm by Radha Jean-Baptiste with good hemostasis. Patient tolerated the procedure well without complications.   07/14/25   Vaina Hwang MA